# Patient Record
Sex: FEMALE | Race: WHITE | Employment: OTHER | URBAN - METROPOLITAN AREA
[De-identification: names, ages, dates, MRNs, and addresses within clinical notes are randomized per-mention and may not be internally consistent; named-entity substitution may affect disease eponyms.]

---

## 2017-03-21 PROCEDURE — 87086 URINE CULTURE/COLONY COUNT: CPT | Performed by: INTERNAL MEDICINE

## 2017-05-03 PROCEDURE — 87086 URINE CULTURE/COLONY COUNT: CPT | Performed by: OBSTETRICS & GYNECOLOGY

## 2017-05-03 PROCEDURE — 88175 CYTOPATH C/V AUTO FLUID REDO: CPT | Performed by: OBSTETRICS & GYNECOLOGY

## 2018-03-23 NOTE — ED PROVIDER NOTES
Patient Seen in: [facility] Emergency Department    History   Patient presents with:  Dyspnea ONOFRE SOB (respiratory)  Allergic Rxn Allergies (immune)    Stated Complaint: pt states \"I'm having an allergic reaction\", rachel duagn    HPI    67-year-old air)    Current:/94   Pulse 110   Temp 99.5 °F (37.5 °C)   Resp 20   Ht 172.7 cm (5' 8\")   Wt 81.6 kg   SpO2 100%   BMI 27.37 kg/m²         Physical Exam    HEENT : NCAT, EOMI, PEERL, throat clear, neck supple, no JVD, trachea midline, No LAD  Heart

## 2020-01-08 ENCOUNTER — HOSPITAL ENCOUNTER (EMERGENCY)
Facility: HOSPITAL | Age: 70
Discharge: HOME | End: 2020-01-08
Attending: EMERGENCY MEDICINE
Payer: MEDICARE

## 2020-01-08 VITALS
SYSTOLIC BLOOD PRESSURE: 120 MMHG | TEMPERATURE: 98 F | DIASTOLIC BLOOD PRESSURE: 65 MMHG | RESPIRATION RATE: 18 BRPM | OXYGEN SATURATION: 97 % | WEIGHT: 180 LBS | BODY MASS INDEX: 28.25 KG/M2 | HEIGHT: 67 IN | HEART RATE: 85 BPM

## 2020-01-08 DIAGNOSIS — R11.2 NAUSEA AND VOMITING IN ADULT: Primary | ICD-10-CM

## 2020-01-08 LAB
ALBUMIN SERPL-MCNC: 4.1 G/DL (ref 3.4–5)
ALBUMIN/GLOB SERPL: 1 {RATIO} (ref 1–2)
ALP LIVER SERPL-CCNC: 97 U/L (ref 55–142)
ALT SERPL-CCNC: 34 U/L (ref 13–56)
ANION GAP SERPL CALC-SCNC: 9 MMOL/L (ref 0–18)
AST SERPL-CCNC: 24 U/L (ref 15–37)
BASOPHILS # BLD AUTO: 0.06 X10(3) UL (ref 0–0.2)
BASOPHILS NFR BLD AUTO: 0.7 %
BILIRUB SERPL-MCNC: 0.6 MG/DL (ref 0.1–2)
BUN BLD-MCNC: 11 MG/DL (ref 7–18)
BUN/CREAT SERPL: 11.5 (ref 10–20)
CALCIUM BLD-MCNC: 9.2 MG/DL (ref 8.5–10.1)
CHLORIDE SERPL-SCNC: 102 MMOL/L (ref 98–112)
CO2 SERPL-SCNC: 23 MMOL/L (ref 21–32)
CREAT BLD-MCNC: 0.96 MG/DL (ref 0.55–1.02)
DEPRECATED RDW RBC AUTO: 42.2 FL (ref 35.1–46.3)
EOSINOPHIL # BLD AUTO: 0.01 X10(3) UL (ref 0–0.7)
EOSINOPHIL NFR BLD AUTO: 0.1 %
ERYTHROCYTE [DISTWIDTH] IN BLOOD BY AUTOMATED COUNT: 12.8 % (ref 11–15)
GLOBULIN PLAS-MCNC: 4.1 G/DL (ref 2.8–4.4)
GLUCOSE BLD-MCNC: 147 MG/DL (ref 70–99)
HCT VFR BLD AUTO: 45.6 % (ref 35–48)
HGB BLD-MCNC: 15.4 G/DL (ref 12–16)
IMM GRANULOCYTES # BLD AUTO: 0.02 X10(3) UL (ref 0–1)
IMM GRANULOCYTES NFR BLD: 0.2 %
LIPASE SERPL-CCNC: 197 U/L (ref 73–393)
LYMPHOCYTES # BLD AUTO: 1.24 X10(3) UL (ref 1–4)
LYMPHOCYTES NFR BLD AUTO: 14.7 %
M PROTEIN MFR SERPL ELPH: 8.2 G/DL (ref 6.4–8.2)
MCH RBC QN AUTO: 29.8 PG (ref 26–34)
MCHC RBC AUTO-ENTMCNC: 33.8 G/DL (ref 31–37)
MCV RBC AUTO: 88.4 FL (ref 80–100)
MONOCYTES # BLD AUTO: 0.44 X10(3) UL (ref 0.1–1)
MONOCYTES NFR BLD AUTO: 5.2 %
NEUTROPHILS # BLD AUTO: 6.66 X10 (3) UL (ref 1.5–7.7)
NEUTROPHILS # BLD AUTO: 6.66 X10(3) UL (ref 1.5–7.7)
NEUTROPHILS NFR BLD AUTO: 79.1 %
OSMOLALITY SERPL CALC.SUM OF ELEC: 280 MOSM/KG (ref 275–295)
PLATELET # BLD AUTO: 288 10(3)UL (ref 150–450)
POTASSIUM SERPL-SCNC: 3.7 MMOL/L (ref 3.5–5.1)
RBC # BLD AUTO: 5.16 X10(6)UL (ref 3.8–5.3)
SODIUM SERPL-SCNC: 134 MMOL/L (ref 136–145)
WBC # BLD AUTO: 8.4 X10(3) UL (ref 4–11)

## 2020-01-08 PROCEDURE — 83690 ASSAY OF LIPASE: CPT | Performed by: EMERGENCY MEDICINE

## 2020-01-08 PROCEDURE — 80053 COMPREHEN METABOLIC PANEL: CPT | Performed by: EMERGENCY MEDICINE

## 2020-01-08 PROCEDURE — 96374 THER/PROPH/DIAG INJ IV PUSH: CPT

## 2020-01-08 PROCEDURE — 96375 TX/PRO/DX INJ NEW DRUG ADDON: CPT

## 2020-01-08 PROCEDURE — 80053 COMPREHEN METABOLIC PANEL: CPT

## 2020-01-08 PROCEDURE — 85025 COMPLETE CBC W/AUTO DIFF WBC: CPT

## 2020-01-08 PROCEDURE — 83690 ASSAY OF LIPASE: CPT

## 2020-01-08 PROCEDURE — 85025 COMPLETE CBC W/AUTO DIFF WBC: CPT | Performed by: EMERGENCY MEDICINE

## 2020-01-08 PROCEDURE — 99284 EMERGENCY DEPT VISIT MOD MDM: CPT

## 2020-01-08 PROCEDURE — 96361 HYDRATE IV INFUSION ADD-ON: CPT

## 2020-01-08 RX ORDER — DIPHENHYDRAMINE HYDROCHLORIDE 50 MG/ML
25 INJECTION INTRAMUSCULAR; INTRAVENOUS ONCE
Status: COMPLETED | OUTPATIENT
Start: 2020-01-08 | End: 2020-01-08

## 2020-01-08 RX ORDER — ONDANSETRON 2 MG/ML
4 INJECTION INTRAMUSCULAR; INTRAVENOUS ONCE
Status: COMPLETED | OUTPATIENT
Start: 2020-01-08 | End: 2020-01-08

## 2020-01-08 RX ORDER — METOCLOPRAMIDE HYDROCHLORIDE 5 MG/ML
10 INJECTION INTRAMUSCULAR; INTRAVENOUS ONCE
Status: COMPLETED | OUTPATIENT
Start: 2020-01-08 | End: 2020-01-08

## 2020-01-08 RX ORDER — ONDANSETRON 4 MG/1
4 TABLET, ORALLY DISINTEGRATING ORAL EVERY 4 HOURS PRN
Qty: 10 TABLET | Refills: 0 | Status: SHIPPED | OUTPATIENT
Start: 2020-01-08 | End: 2020-01-15

## 2020-01-08 NOTE — ED INITIAL ASSESSMENT (HPI)
Patient recently had the flu over [address] Last night patient developed dizziness and headache and vomiting. Cramping to toes, flushed face. Patient had these same symptoms when she had the flu. No known fevers. Patient anxious.

## 2020-01-09 NOTE — ED PROVIDER NOTES
Patient Seen in: [facility] Emergency Department      History   Patient presents with:  Nausea/Vomiting/Diarrhea    Stated Complaint: vomiting, shaking, headache    HPI    Onelia is a 68-year-old female who presents with her  today for evalua Temp 98.1 °F (36.7 °C)   Temp src Temporal   SpO2 98 %   O2 Device None (Room air)       Current:/65   Pulse 85   Temp 98.1 °F (36.7 °C) (Temporal)   Resp 18   Ht 170.2 cm (5' 7\")   Wt 81.6 kg   SpO2 97%   BMI 28.19 kg/m²         Physical Exam  Nu URINALYSIS WITH CULTURE REFLEX   RAINBOW DRAW BLUE   RAINBOW DRAW LAVENDER   RAINBOW DRAW LIGHT GREEN   RAINBOW DRAW GOLD   CBC W/ DIFFERENTIAL     This case is discussed with Dr. Prema Ross who evaluates patient and agrees with the plan of care.      Patient

## 2021-03-15 DIAGNOSIS — Z23 NEED FOR VACCINATION: ICD-10-CM

## 2021-07-06 ENCOUNTER — HOSPITAL ENCOUNTER (OUTPATIENT)
Age: 71
Discharge: HOME | End: 2021-07-06
Attending: EMERGENCY MEDICINE
Payer: MEDICARE

## 2021-07-06 ENCOUNTER — APPOINTMENT (OUTPATIENT)
Dept: GENERAL RADIOLOGY | Age: 71
End: 2021-07-06
Attending: EMERGENCY MEDICINE
Payer: MEDICARE

## 2021-07-06 VITALS
HEART RATE: 87 BPM | RESPIRATION RATE: 16 BRPM | SYSTOLIC BLOOD PRESSURE: 132 MMHG | DIASTOLIC BLOOD PRESSURE: 93 MMHG | OXYGEN SATURATION: 100 % | TEMPERATURE: 98 F

## 2021-07-06 DIAGNOSIS — S92.912A CLOSED NONDISPLACED FRACTURE OF PHALANX OF TOE OF LEFT FOOT, UNSPECIFIED TOE, INITIAL ENCOUNTER: Primary | ICD-10-CM

## 2021-07-06 PROCEDURE — 73660 X-RAY EXAM OF TOE(S): CPT | Performed by: EMERGENCY MEDICINE

## 2021-07-06 PROCEDURE — 28510 TREATMENT OF TOE FRACTURE: CPT

## 2021-07-06 PROCEDURE — 99213 OFFICE O/P EST LOW 20 MIN: CPT

## 2021-07-06 RX ORDER — HYDROCODONE BITARTRATE AND ACETAMINOPHEN 5; 325 MG/1; MG/1
1-2 TABLET ORAL EVERY 6 HOURS PRN
Qty: 10 TABLET | Refills: 0 | Status: SHIPPED | OUTPATIENT
Start: 2021-07-06 | End: 2021-07-13

## 2021-07-07 NOTE — ED PROVIDER NOTES
Patient Seen in: [facility]      History   Patient presents with:   Toe Injury    Stated Complaint: toe pain/injury    HPI/Subjective:   HPI    Patient is 77-year-old male who states this morning she accidentally kicked a piece of furnitur foot nontender. SKIN: No rash, good turgor. NEURO: Patient answers questions appropriately. No focal deficits appreciated.          ED Course   Labs Reviewed - No data to display       Left second toe fracture distal phalanx         MDM      Patient give

## 2021-07-07 NOTE — ED INITIAL ASSESSMENT (HPI)
Left 2nd toe - pt stubbed to on a gym equipement at about 11 am today. Per pt she is unable to bend the toe. Dark purple  Discoloration noted, nail intact. John Looney

## 2022-04-25 ENCOUNTER — TELEPHONE (OUTPATIENT)
Dept: PODIATRY CLINIC | Facility: CLINIC | Age: 72
End: 2022-04-25

## 2022-04-25 NOTE — TELEPHONE ENCOUNTER
Former pt of Dr Cindy Garduno called about orthotics that were guaranteed for five years and only 1years old and disintegrating looking for solution.

## 2022-05-12 ENCOUNTER — HOSPITAL ENCOUNTER (OUTPATIENT)
Age: 72
Discharge: HOME | End: 2022-05-12
Payer: MEDICARE

## 2022-05-12 ENCOUNTER — APPOINTMENT (OUTPATIENT)
Dept: GENERAL RADIOLOGY | Age: 72
End: 2022-05-12
Attending: PHYSICIAN ASSISTANT
Payer: MEDICARE

## 2022-05-12 VITALS
DIASTOLIC BLOOD PRESSURE: 76 MMHG | RESPIRATION RATE: 16 BRPM | HEART RATE: 92 BPM | SYSTOLIC BLOOD PRESSURE: 124 MMHG | OXYGEN SATURATION: 98 % | TEMPERATURE: 98 F

## 2022-05-12 DIAGNOSIS — T14.8XXA FOREIGN BODY IN SKIN: Primary | ICD-10-CM

## 2022-05-12 PROCEDURE — 99213 OFFICE O/P EST LOW 20 MIN: CPT

## 2022-05-12 PROCEDURE — 73660 X-RAY EXAM OF TOE(S): CPT | Performed by: PHYSICIAN ASSISTANT

## 2022-05-12 NOTE — ED INITIAL ASSESSMENT (HPI)
right foot - per pt she noted a trail of blood  On the floor , bleeding from the right big toe. Pt is unsure if she stepped on a glass or from splinter form a the wooden floor. States big toe still bleeding intermittently. She states a glass broke 2 weeks ago per pt.  Last tdap 2021

## 2022-06-06 ENCOUNTER — TELEPHONE (OUTPATIENT)
Dept: PODIATRY CLINIC | Facility: CLINIC | Age: 72
End: 2022-06-06

## 2022-06-06 NOTE — TELEPHONE ENCOUNTER
Patient came to office to  refurbished orthotics. She has never had the bump in middle of arch and will try for a day and may call back.

## 2022-07-01 ENCOUNTER — HOSPITAL ENCOUNTER (OUTPATIENT)
Dept: GENERAL RADIOLOGY | Age: 72
Discharge: HOME | End: 2022-07-01
Attending: PHYSICIAN ASSISTANT
Payer: MEDICARE

## 2022-07-01 DIAGNOSIS — U07.1 INFECTION DUE TO 2019-NCOV: ICD-10-CM

## 2022-07-01 DIAGNOSIS — R06.00 DYSPNEA: ICD-10-CM

## 2022-07-01 PROCEDURE — 71046 X-RAY EXAM CHEST 2 VIEWS: CPT | Performed by: PHYSICIAN ASSISTANT

## 2023-08-27 ENCOUNTER — HOSPITAL ENCOUNTER (EMERGENCY)
Facility: HOSPITAL | Age: 73
Discharge: HOME | End: 2023-08-27
Attending: STUDENT IN AN ORGANIZED HEALTH CARE EDUCATION/TRAINING PROGRAM
Payer: MEDICARE

## 2023-08-27 ENCOUNTER — APPOINTMENT (OUTPATIENT)
Dept: CT IMAGING | Facility: HOSPITAL | Age: 73
End: 2023-08-27
Attending: STUDENT IN AN ORGANIZED HEALTH CARE EDUCATION/TRAINING PROGRAM
Payer: MEDICARE

## 2023-08-27 VITALS
WEIGHT: 190 LBS | HEART RATE: 73 BPM | RESPIRATION RATE: 16 BRPM | BODY MASS INDEX: 29 KG/M2 | DIASTOLIC BLOOD PRESSURE: 97 MMHG | SYSTOLIC BLOOD PRESSURE: 121 MMHG | TEMPERATURE: 98 F | OXYGEN SATURATION: 97 %

## 2023-08-27 DIAGNOSIS — H81.10 BENIGN PAROXYSMAL POSITIONAL VERTIGO, UNSPECIFIED LATERALITY: Primary | ICD-10-CM

## 2023-08-27 LAB
ALBUMIN SERPL-MCNC: 3.4 G/DL (ref 3.4–5)
ALBUMIN/GLOB SERPL: 0.9 {RATIO} (ref 1–2)
ALP LIVER SERPL-CCNC: 64 U/L
ALT SERPL-CCNC: 25 U/L
ANION GAP SERPL CALC-SCNC: 3 MMOL/L (ref 0–18)
AST SERPL-CCNC: 12 U/L (ref 15–37)
BASOPHILS # BLD AUTO: 0.05 X10(3) UL (ref 0–0.2)
BASOPHILS NFR BLD AUTO: 0.8 %
BILIRUB SERPL-MCNC: 0.4 MG/DL (ref 0.1–2)
BUN BLD-MCNC: 12 MG/DL (ref 7–18)
CALCIUM BLD-MCNC: 8.7 MG/DL (ref 8.5–10.1)
CHLORIDE SERPL-SCNC: 107 MMOL/L (ref 98–112)
CO2 SERPL-SCNC: 26 MMOL/L (ref 21–32)
CREAT BLD-MCNC: 0.7 MG/DL
EGFRCR SERPLBLD CKD-EPI 2021: 91 ML/MIN/1.73M2 (ref 60–?)
EOSINOPHIL # BLD AUTO: 0.07 X10(3) UL (ref 0–0.7)
EOSINOPHIL NFR BLD AUTO: 1.1 %
ERYTHROCYTE [DISTWIDTH] IN BLOOD BY AUTOMATED COUNT: 12.5 %
GLOBULIN PLAS-MCNC: 3.6 G/DL (ref 2.8–4.4)
GLUCOSE BLD-MCNC: 130 MG/DL (ref 70–99)
HCT VFR BLD AUTO: 42.9 %
HGB BLD-MCNC: 14.2 G/DL
IMM GRANULOCYTES # BLD AUTO: 0.02 X10(3) UL (ref 0–1)
IMM GRANULOCYTES NFR BLD: 0.3 %
LYMPHOCYTES # BLD AUTO: 1.18 X10(3) UL (ref 1–4)
LYMPHOCYTES NFR BLD AUTO: 18 %
MCH RBC QN AUTO: 29.8 PG (ref 26–34)
MCHC RBC AUTO-ENTMCNC: 33.1 G/DL (ref 31–37)
MCV RBC AUTO: 89.9 FL
MONOCYTES # BLD AUTO: 0.57 X10(3) UL (ref 0.1–1)
MONOCYTES NFR BLD AUTO: 8.7 %
NEUTROPHILS # BLD AUTO: 4.67 X10 (3) UL (ref 1.5–7.7)
NEUTROPHILS # BLD AUTO: 4.67 X10(3) UL (ref 1.5–7.7)
NEUTROPHILS NFR BLD AUTO: 71.1 %
OSMOLALITY SERPL CALC.SUM OF ELEC: 284 MOSM/KG (ref 275–295)
PLATELET # BLD AUTO: 255 10(3)UL (ref 150–450)
POTASSIUM SERPL-SCNC: 3.8 MMOL/L (ref 3.5–5.1)
PROT SERPL-MCNC: 7 G/DL (ref 6.4–8.2)
RBC # BLD AUTO: 4.77 X10(6)UL
SARS-COV-2 RNA RESP QL NAA+PROBE: NOT DETECTED
SODIUM SERPL-SCNC: 136 MMOL/L (ref 136–145)
WBC # BLD AUTO: 6.6 X10(3) UL (ref 4–11)

## 2023-08-27 PROCEDURE — 93010 ELECTROCARDIOGRAM REPORT: CPT

## 2023-08-27 PROCEDURE — 96375 TX/PRO/DX INJ NEW DRUG ADDON: CPT

## 2023-08-27 PROCEDURE — 70450 CT HEAD/BRAIN W/O DYE: CPT | Performed by: STUDENT IN AN ORGANIZED HEALTH CARE EDUCATION/TRAINING PROGRAM

## 2023-08-27 PROCEDURE — 99284 EMERGENCY DEPT VISIT MOD MDM: CPT

## 2023-08-27 PROCEDURE — 96374 THER/PROPH/DIAG INJ IV PUSH: CPT

## 2023-08-27 PROCEDURE — 99285 EMERGENCY DEPT VISIT HI MDM: CPT

## 2023-08-27 PROCEDURE — 85025 COMPLETE CBC W/AUTO DIFF WBC: CPT | Performed by: STUDENT IN AN ORGANIZED HEALTH CARE EDUCATION/TRAINING PROGRAM

## 2023-08-27 PROCEDURE — 80053 COMPREHEN METABOLIC PANEL: CPT | Performed by: STUDENT IN AN ORGANIZED HEALTH CARE EDUCATION/TRAINING PROGRAM

## 2023-08-27 PROCEDURE — 93005 ELECTROCARDIOGRAM TRACING: CPT

## 2023-08-27 PROCEDURE — 85025 COMPLETE CBC W/AUTO DIFF WBC: CPT

## 2023-08-27 PROCEDURE — 80053 COMPREHEN METABOLIC PANEL: CPT

## 2023-08-27 RX ORDER — MECLIZINE HYDROCHLORIDE 25 MG/1
25 TABLET ORAL ONCE
Status: COMPLETED | OUTPATIENT
Start: 2023-08-27 | End: 2023-08-27

## 2023-08-27 RX ORDER — MECLIZINE HYDROCHLORIDE 25 MG/1
25 TABLET ORAL 3 TIMES DAILY PRN
Qty: 30 TABLET | Refills: 0 | Status: SHIPPED | OUTPATIENT
Start: 2023-08-27

## 2023-08-27 RX ORDER — DIPHENHYDRAMINE HYDROCHLORIDE 50 MG/ML
25 INJECTION INTRAMUSCULAR; INTRAVENOUS ONCE
Status: COMPLETED | OUTPATIENT
Start: 2023-08-27 | End: 2023-08-27

## 2023-08-27 RX ORDER — METOCLOPRAMIDE HYDROCHLORIDE 5 MG/ML
10 INJECTION INTRAMUSCULAR; INTRAVENOUS ONCE
Status: COMPLETED | OUTPATIENT
Start: 2023-08-27 | End: 2023-08-27

## 2023-08-27 RX ORDER — DIAZEPAM 5 MG/1
5 TABLET ORAL ONCE
Status: COMPLETED | OUTPATIENT
Start: 2023-08-27 | End: 2023-08-27

## 2023-08-28 ENCOUNTER — OFFICE VISIT (OUTPATIENT)
Dept: NEUROLOGY | Facility: CLINIC | Age: 73
End: 2023-08-28
Payer: MEDICARE

## 2023-08-28 VITALS — DIASTOLIC BLOOD PRESSURE: 78 MMHG | HEART RATE: 72 BPM | SYSTOLIC BLOOD PRESSURE: 128 MMHG | RESPIRATION RATE: 18 BRPM

## 2023-08-28 DIAGNOSIS — R26.81 GAIT INSTABILITY: ICD-10-CM

## 2023-08-28 DIAGNOSIS — R20.0 NUMBNESS IN FEET: Primary | ICD-10-CM

## 2023-08-28 DIAGNOSIS — H81.10 BENIGN PAROXYSMAL POSITIONAL VERTIGO, UNSPECIFIED LATERALITY: ICD-10-CM

## 2023-08-28 LAB
ATRIAL RATE: 65 BPM
P AXIS: 58 DEGREES
P-R INTERVAL: 178 MS
Q-T INTERVAL: 446 MS
QRS DURATION: 90 MS
QTC CALCULATION (BEZET): 463 MS
R AXIS: 52 DEGREES
T AXIS: 31 DEGREES
VENTRICULAR RATE: 65 BPM

## 2023-08-28 PROCEDURE — 99204 OFFICE O/P NEW MOD 45 MIN: CPT | Performed by: OTHER

## 2023-08-28 NOTE — ED INITIAL ASSESSMENT (HPI)
72YF c/c of dizziness Pt state that she started having dizziness and nausea this morning with feeling of the room spinning Pt state that she took an old Antivert and had improvement

## 2023-09-13 ENCOUNTER — TELEPHONE (OUTPATIENT)
Dept: PHYSICAL THERAPY | Facility: HOSPITAL | Age: 73
End: 2023-09-13

## 2023-09-27 ENCOUNTER — PROCEDURE VISIT (OUTPATIENT)
Dept: NEUROLOGY | Facility: CLINIC | Age: 73
End: 2023-09-27
Payer: MEDICARE

## 2023-09-27 DIAGNOSIS — R20.0 NUMBNESS IN FEET: Primary | ICD-10-CM

## 2023-09-27 DIAGNOSIS — R26.81 GAIT INSTABILITY: ICD-10-CM

## 2023-09-27 PROCEDURE — 95910 NRV CNDJ TEST 7-8 STUDIES: CPT | Performed by: OTHER

## 2023-09-27 PROCEDURE — 95886 MUSC TEST DONE W/N TEST COMP: CPT | Performed by: OTHER

## 2023-10-02 ENCOUNTER — TELEPHONE (OUTPATIENT)
Dept: PHYSICAL THERAPY | Facility: HOSPITAL | Age: 73
End: 2023-10-02

## 2023-10-05 ENCOUNTER — OFFICE VISIT (OUTPATIENT)
Dept: PHYSICAL THERAPY | Facility: HOSPITAL | Age: 73
End: 2023-10-05
Attending: Other
Payer: MEDICARE

## 2023-10-05 DIAGNOSIS — H81.10 BENIGN PAROXYSMAL POSITIONAL VERTIGO, UNSPECIFIED LATERALITY: Primary | ICD-10-CM

## 2023-10-05 PROCEDURE — 97110 THERAPEUTIC EXERCISES: CPT

## 2023-10-05 PROCEDURE — 97161 PT EVAL LOW COMPLEX 20 MIN: CPT

## 2023-10-05 NOTE — PROGRESS NOTES
VESTIBULAR EVALUATION:     Diagnosis:   Benign paroxysmal positional vertigo, unspecified laterality       Referring Provider: Tao  Date of Evaluation:    10/5/2023    Precautions:  None Next MD visit:   none scheduled  Date of Surgery: n/a     PATIENT SUMMARY   Jerzy Terry is a 74year old female who presents to therapy today with reports of intermittent hx of vertiginous episodes over her lifetime. States that she has had vertigo about 5x to her recall, typically with years in between episodes. Most recent episode was on 8/27/23. She states that for a few weeks leading up to this, she was experiencing bouts of lightheadedness, but not spinning. On 8/27, she experienced severe vertigo with spinning, so that she had to stay in bed with eyes closed and not move all day. She did go to the ED and reports that she had blood work, EKG, and imaging of the brain, all of which were unremarkable. States they were not able to do much for her at the ED, told her to go to PT. She was seeing a neurologist for a separate issue, so discussed with him, he also referred her to PT. At this time, notes that she has not had any dizziness for over 3 weeks. She only had the severe spinning during the initial onset, following that, she was feeling not right, has been avoiding bending over, some lightheadedness, but generally has been back to her baseline. Wanted to come today to see if there was anything else that could be done to help her avoid these episodes in the future or to understand what to do should they come on. Has hx of motion sickness and neuropathy at baseline.      Reason for onset: unknown, patient denies hx of known neurological issue, hitting her head, concussion, illness, travel, change in medication, or known issue with heart/BP; she speculates may also be related to times of stress and anxiety for her  Meclizine: intermittent use, sometimes before activities like getting on a boat  N/V: not currently, can become nauseous with episodes of dizziness     Symptoms with cough/sneeze or loud noise: No   Falls: No  Hx of migraines: No   Hx of vision issue: No   Hx of hearing issues: tinnitus [place] ears, intermittent    Dizziness: Current 0/10, Best 0/10, Worst 10/10  Quality: room spinning, lightheadedness  Frequency/Duration:  severe for minutes to hours followed by lightheadedness that can last days   Aggravates: Unsure, has still been avoiding bending over with head down  Relieves: Not moving and Unsure    Headache: Denies  Cervical pain: intermittent stiffness, generally denies, not restricting her    Current functional limitations include currently only avoiding bending over with head down because fearful will make her dizzy, she is otherwise not restricting activity in any other way; when she is having a dizzy episode, she cannot do anything but lay in bed in the dark with EC. Social history:  Patient lives at home with her , she is drives, is a community ambulator, and is retired. Rashad Saleh describes prior level of function as IND in household and community level activity. Pt goals include to better understand what is causing this so that she knows what to do when it comes on. Past medical history was reviewed with Rashad Saleh. Significant findings include:  Past Medical History:   Diagnosis Date    Allergic rhinitis     Anxiety state     Esophageal reflux     Fibromyalgia     Pneumonia     Seasonal allergies     Sensory neuropathy     cough    Sleep apnea           ASSESSMENT  Rashad Saleh is a 74year old female who presents for skilled physical therapy evaluation on 10/5/23 with primary c/o episodic spinning vertigo last occurred on 8/27/23. The results of the objective tests and measures show vestibulo-ocular, oculomotor, positional assessment, and postural control all unremarkable.   Functional deficits include but are not limited to none at this time, she is completely debilitated when dizziness comes on, has not occurred for 3+ weeks with performing all normal activity. Signs and symptoms are consistent with diagnosis of vertigo, uncertain of more specific diagnosis at this time since is not actively having dizziness symptoms, requested patient call to return to clinic in future to be reassessed when vertigo is active. Pt and PT discussed evaluation findings, pathology, POC and HEP. Pt voiced understanding and performs HEP correctly. Skilled Physical Therapy is medically necessary to address the above impairments and reach functional goals. OBJECTIVE:   Physical Exam:  Posture/Observation: Patient sits and stands with appropriate posturing. She is pleasant, oriented, compliant, and appropriate, expresses motivation to improve. Neuro Screen: Sensation: NFT, patient reports neuropathy at baseline      Coordination Testing:   Finger to Nose: WNL   Pronation/supination: WNL   Toe tapping:  WNL     Cervical spine ROM: WFL throughout without dizziness  Adverse neuro signs with ROM: yes no: no     Occulomotor & Vestibulo-Ocular Exam:  Spontaneous Nystagmus: room light: negative ;  fixation blocked: negative  Smooth Pursuit: Negative  Saccades: Negative  Gaze Evoked Nystagmus:  room light: Negative; fixation blocked: Negative  Head Thrust: Negative  VOR screen:  Negative/WNL slow and rapid, vertical and horizontal    VOR Cancellation: Negative   Convergence: Negative  Cover/Uncover:  Negative  Cross Cover:  Negative  Head Shaking Nystagmus: Negative  Dynamic Visual Acuity:  Not Tested    Positional Testing:   Lisle-Hallpike: R Negative, L Negative   Roll Test [facility]): Negative      Postural Control:   Romberg: EO >30 sec, EC >30 sec   Romberg on Foam: EO >30 sec, EC >30 sec with mod sway  Tandem Stance: R back EO >10 sec; L back EO >10 sec  SLS: R EO >5 sec; L EO >5 sec    Functional Mobility:   Gait: pt ambulates on level ground without AD, generally unremarkable  4 Item DGI: 12/12  Timed Up and Go: TBA in future as needed Five Times Sit to Stand Test: TBA in future as needed   Fwd bending with head down to retrieve item from floor: no issue, no dizziness     Today's Treatment and Response: Educated patient that at this time, all testing performed in session is considered normal and as is not having dizziness episode at this time, cannot give diagnosis, requested that she call to return immediately in future should same symptoms return, if symptoms differ, need to return to ED or MD first.   Pt education was provided on exam findings, treatment diagnosis, treatment plan, expectations, and prognosis. Pt was also provided recommendations for possible dizziness after evaluation. Patient was instructed in and issued a HEP for: to continue to monitor symptoms and call to return immediately in future should symptoms return    Charges: PT Eval Low Complexity, Therex x 1      Total Timed Treatment: 10 min     Total Treatment Time: 50 min     Based on clinical rationale and outcome measures, this evaluation involved Low Complexity decision making. PLAN OF CARE:    Goals: None at this time; educated patient that at this time, all testing performed in session is considered normal and as is not having dizziness episode at this time, cannot give diagnosis, requested that she call to return immediately in future should same symptoms return, if symptoms differ, need to return to ED or MD first.       Frequency / Duration: Patient will not be seen at this time due to all testing currently normal, however, should dizziness return within dates of POC, may return for further assessment. If do not hear from patient in that time, will consider this date discharge. Education or treatment limitation: None   Rehab Potential: good     Patient/Family/Caregiver was advised of these findings, precautions, and treatment options and has agreed to actively participate in planning and for this course of care.      Thank you for your referral. Please co-sign or sign and return this letter via fax as soon as possible to [phone]. If you have any questions, please contact me at Dept: [phone]    Sincerely,  Electronically signed by therapist: Nelson Meraz PT, DPT  [de-identified] certification required: Yes  I certify the need for these services furnished under this plan of treatment and while under my care.     X___________________________________________________ Date____________________    Certification From: 61/2/2930  To:1/3/2024

## 2023-10-09 ENCOUNTER — APPOINTMENT (OUTPATIENT)
Dept: PHYSICAL THERAPY | Facility: HOSPITAL | Age: 73
End: 2023-10-09
Attending: Other
Payer: MEDICARE

## 2023-10-17 ENCOUNTER — APPOINTMENT (OUTPATIENT)
Dept: PHYSICAL THERAPY | Facility: HOSPITAL | Age: 73
End: 2023-10-17
Attending: Other
Payer: MEDICARE

## 2023-10-23 ENCOUNTER — APPOINTMENT (OUTPATIENT)
Dept: PHYSICAL THERAPY | Facility: HOSPITAL | Age: 73
End: 2023-10-23
Attending: Other
Payer: MEDICARE

## 2023-10-25 ENCOUNTER — OFFICE VISIT (OUTPATIENT)
Dept: SURGERY | Facility: CLINIC | Age: 73
End: 2023-10-25

## 2023-10-25 ENCOUNTER — TELEPHONE (OUTPATIENT)
Dept: SURGERY | Facility: CLINIC | Age: 73
End: 2023-10-25

## 2023-10-25 DIAGNOSIS — R82.90 URINE FINDING: ICD-10-CM

## 2023-10-25 DIAGNOSIS — N39.41 URGE INCONTINENCE: Primary | ICD-10-CM

## 2023-10-25 DIAGNOSIS — R31.29 MICROHEMATURIA: ICD-10-CM

## 2023-10-25 LAB
APPEARANCE: CLEAR
BILIRUBIN: NEGATIVE
GLUCOSE (URINE DIPSTICK): NEGATIVE MG/DL
KETONES (URINE DIPSTICK): NEGATIVE MG/DL
LEUKOCYTES: NEGATIVE
MULTISTIX LOT#: ABNORMAL NUMERIC
NITRITE, URINE: NEGATIVE
PH, URINE: 5.5 (ref 4.5–8)
PROTEIN (URINE DIPSTICK): NEGATIVE MG/DL
SPECIFIC GRAVITY: 1.02 (ref 1–1.03)
URINE-COLOR: YELLOW
UROBILINOGEN,SEMI-QN: 0.2 MG/DL (ref 0–1.9)

## 2023-10-25 PROCEDURE — 81003 URINALYSIS AUTO W/O SCOPE: CPT | Performed by: PHYSICIAN ASSISTANT

## 2023-10-25 PROCEDURE — 99243 OFF/OP CNSLTJ NEW/EST LOW 30: CPT | Performed by: PHYSICIAN ASSISTANT

## 2023-10-25 RX ORDER — CETIRIZINE HYDROCHLORIDE 10 MG/1
TABLET, CHEWABLE ORAL
COMMUNITY

## 2023-10-25 RX ORDER — VIBEGRON 75 MG/1
75 TABLET, FILM COATED ORAL DAILY
Qty: 90 TABLET | Refills: 3 | Status: SHIPPED | OUTPATIENT
Start: 2023-10-25 | End: 2024-10-19

## 2023-10-25 RX ORDER — CHLORAL HYDRATE 500 MG
CAPSULE ORAL
COMMUNITY

## 2023-10-25 NOTE — PATIENT INSTRUCTIONS
Dietary Irritants    What you can do to help relieve your symptoms:    The purpose of this handout is to provide information that can help you discover what you are ingesting or doing that may be contributing to your recurrent irritative voiding symptoms and what steps you can take to relieve your discomfort. Frequency, urgency, and pain on urination are symptoms of inflammation or irritation. These symptoms can be caused by bacterial infections or substances in the urine causing irritation to the lining of the bladder or urethra. Bacterial infections can be treated with antibiotics. But irritation of the bladder or urethra can results from other cause that will probably not respond to antibiotics. What to do at the first sign of bladder or urethral discomfort: The basic treatment for irritable bladder symptoms, whether induced by bacterial or nonbacterial irritants, is hydration. At the first sign of discomfort, start drinking 16 oz of water mixed with 1 teaspoon of baking soda. Then drink 8 oz of plain water every 20 minutes for the next 2-3 hours. Repeat drinking 16 oz of water with baking soda in 3-4 hours. (Baking soda contains sodium and can cause fluid retention. If you have a history of high blood pressure, congestive heart failure, or renal disease, you should not use more than twice in 24 hours.)    You can take acetaminophen to relieve the pain (two extra strength or three regular strength tablets). Bladder analgesics such as phenazopyridine (Pyridium) may help and will not alter the results of a urine culture should the symptoms not be significantly relieved by diluting the urine and flushing out the bladder. A warm bath to help muscles of the pelvic floor relax will also help lessen discomfort. Before starting any antibiotic, a urine culture must be taken. If the conservative measures mentioned above are not helping, call the office to arrange for a urine culture.  If the specimen is contaminated with vaginal cells and bacteria and you are severely symptomatic, then a catheterized specimen should be obtained directly from your bladder to determine precisely whether bacterial infection is the cause of your symptoms. Dietary irritants to the urinary tract to avoid:    Certain food can contribute to urinary frequency, urgency, and discomfort. If bladder symptoms are related to dietary factors, strict adherence to a diet that eliminated the food should bring significant relief in 10 days. Once you are feeling better, you can begin to add foods back into your diet one at a time. If the symptoms return, you will be able to identify the irritant. As you add foods back to your diet it is very important that you drink significant amounts of water. These foods are acidic and are considered irritants to the bladder. They should be avoided. Alcoholic beverages  Apples and apple juice  Cantaloupe  Carbonated beverages  Chili and spicy foods  Citrus fruit  Chocolate  Coffee (including decaf)  Cranberries and juice  Grapes  Guava  Peaches  Pineapple  Plums  Strawberries  Sugar*  Tea  Tomatoes  Vit B Complex  Vinegar  *Some women report that sugar flares their symptoms. Low acid fruit substitutions include apricots, papaya, pears, and watermelon. Coffee drinkers can drink Kava or other low-acid instant drinks. Tea drinks can substitute non-citrus herbal and sun brewed teas. Calcium carbonate co-buffered with calcium ascorbate can be substituted for Vitamin C. Use the link below to find additional information regarding Overactive Bladder:    [email or web address]

## 2023-10-25 NOTE — PROGRESS NOTES
[facility], [address] [facility] [place]    Urology Consult Note    History of Present Illness:   Patient is a 74year old female with hx of chronic cough, peripheral neuropathy, who presents today for consultation from Dr. Aditya Salguero office for urge incontinence. Duration:13 years    denies leakage with cough, sneeze, activity:  complains of leakage with strong urge to void:   denies leakage without sensory awareness:  denies bladder or pelvic pain:     Frequency q 3-4 hours  Nocturia x 0   Pads per day: 4+ thick pads    1 cup of coffee daily in the morning. No additional caffiene. denies hematuria. denies dysuria  denies uti's    Was seen by gynecologist previously and given medications. Had been doing PFT with minimal improvement, 2018.     OB/GYN history:   Vaginal births:  2   :  0   Hysterectomy:  none    HISTORY:  Past Medical History:   Diagnosis Date    Allergic rhinitis     Anxiety state     Esophageal reflux     Fibromyalgia     Pneumonia     Seasonal allergies     Sensory neuropathy     cough    Sleep apnea       Past Surgical History:   Procedure Laterality Date    CATARACT      COLONOSCOPY      Dr. Ana Kimble, polyps    COLONOSCOPY  2022    2 hepatic flexure polyps    DESIGN CUSTOM BREAST IMPLANT      Breast augmentation    EGD      Dr. Ana Kimble    EGD  2022    normal    EGD N/A 2022    Procedure: ESOPHAGOGASTRODUODENOSCOPY, COLONOSCOPY, with polypectomy;  Surgeon: Veronica Thomas MD;  Location: [address] [place] [address]    IMPLANT LEFT      IMPLANT RIGHT      LEEP  2000    UPPER GI ENDOSCOPY,EXAM        Family History   Problem Relation Age of Onset    Hypertension Father     High Cholesterol Father     Other (Atrial Fib) Father     Other (DVT) Father     Hypertension Mother     High Cholesterol Mother     Glaucoma Mother     Other (Other) Mother     Other (Kidney stones) Mother     Other (Ulcers) Mother     Other (Coronary artery disease) Paternal Grandfather     Cancer Paternal Grandmother         Lung CA    Other (Osteoporosis) Maternal Grandmother     Other (Gout) Maternal Grandmother     Arthritis Other         Siblings    Glaucoma Other         Siblings    Asthma Other         Siblings, Children    Hypertension Brother     Prostate Cancer Brother       Social History:   Social History     Socioeconomic History    Marital status:    Tobacco Use    Smoking status: Never    Smokeless tobacco: Never   Vaping Use    Vaping Use: Never used   Substance and Sexual Activity    Alcohol use: No     Alcohol/week: 0.0 standard drinks of alcohol    Drug use: No    Sexual activity: Yes     Partners: Male        Allergies    Shrimp                  OTHER (SEE COMMENTS)    Comment:BREAKS OUT AROUND MOUTH  Sulfa Antibiotics       RASH    Review of Systems:   A 10-point review of systems was completed and is negative other than as noted above. Physical Exam:   There were no vitals taken for this visit. GENERAL APPEARANCE: well developed, well nourished, in no acute distress  NEUROLOGIC: no localizing neurologic signs, alert and oriented x 3, converses appropriately  HEAD: atraumatic, normocephalic  EYES: sclera non-icteric  ORAL CAVITY: mucosa moist  NECK/THYROID: no obvious masses or goiter  LUNGS: non-labored breathing  ABDOMEN: soft, nontender, nondistended  No CVAT  EXTREMITIES: warm, well-perfused. No clubbing, cyanosis or edema.   SKIN: no obvious rashes    Results:     Laboratory Data:  Lab Results   Component Value Date    WBC 6.6 08/27/2023    HGB 14.2 08/27/2023    .0 08/27/2023     Lab Results   Component Value Date     08/27/2023    K 3.8 08/27/2023     08/27/2023    CO2 26.0 08/27/2023    BUN 12 08/27/2023     (H) 08/27/2023    GFRAA 69 01/08/2020    AST 12 (L) 08/27/2023    ALT 25 08/27/2023    TP 7.0 08/27/2023    ALB 3.4 08/27/2023    CA 8.7 08/27/2023       Urinalysis Results (last three years):  Recent Labs 10/25/23  1325   SPECGRAVITY 1.025   PHURINE 5.5   NITRITE Negative       Urine Culture Results (last three years):  Lab Results   Component Value Date    URINECUL No Growth at 18-24 hrs. 05/03/2017    URINECUL No Growth at 18-24 hrs. 03/21/2017    URINECUL No Growth at 18-24 hrs. 10/20/2016       Imaging  No results found. Impression:     Patient is a 74year old female with hx of chronic cough, peripheral neuropathy, who presents today for consultation from Dr. Ifrah Hayes office for urge incontinence. 1. Kegel exercises reviewed. 2. OAB teaching with fluid and voiding techniques reviewed- Double voiding, bending over after void to completely empty. Recommend voids every 2-3 hours. 3. Benefit of biofeedback reviewed. Previously completed PFT with minimal improvement. 4. Medication trial: Gemtesa 75mg daily. .    Recommendations:  Timed voids every 2-3 hours. Bladder ultrasound. If normal ultrasound and no improvement with timed voids, will start Gemtesa 75mg and follow up in 2 months. Thank you very much for this consult. Please call if there are any questions or concerns.      Guanako Elias PA-C  Urology  [phone]    Date: 10/25/2023

## 2023-10-26 NOTE — TELEPHONE ENCOUNTER
Per pt has questions regarding tests that need to be completed, unsure if ultrasound still needs to be done. Please call thank you.

## 2023-10-30 ENCOUNTER — APPOINTMENT (OUTPATIENT)
Dept: PHYSICAL THERAPY | Facility: HOSPITAL | Age: 73
End: 2023-10-30
Attending: Other
Payer: MEDICARE

## 2023-11-01 ENCOUNTER — TELEPHONE (OUTPATIENT)
Dept: SURGERY | Facility: CLINIC | Age: 73
End: 2023-11-01

## 2023-11-01 NOTE — TELEPHONE ENCOUNTER
Patient calling has questions regarding medication that needs to be taking before the cystoscopy and also has questions and also would like to know if she will need to f/u with SHANEL kwan after the procedure?    Please advise     Vibegron (Myrle Inez) 75 MG Oral Tab

## 2023-11-02 NOTE — TELEPHONE ENCOUNTER
RN called patient. No answer. Left message to call office. Note, this patient was last seen on 10/25/23:    Recommendations:  Timed voids every 2-3 hours. Bladder ultrasound. If normal ultrasound and no improvement with timed voids, will start Gemtesa 75mg and follow up in 2 months. Medication Detail    Medication Quantity Refills Start End   Vibegron (GEMTESA) 75 MG Oral Tab 90 tablet 3 10/25/2023 10/19/2024   Sig:   Take 75 mg by mouth daily.

## 2023-11-03 ENCOUNTER — HOSPITAL ENCOUNTER (OUTPATIENT)
Dept: ULTRASOUND IMAGING | Age: 73
Discharge: HOME | End: 2023-11-03
Attending: PHYSICIAN ASSISTANT
Payer: MEDICARE

## 2023-11-03 DIAGNOSIS — N39.41 URGE INCONTINENCE: ICD-10-CM

## 2023-11-03 PROCEDURE — 76857 US EXAM PELVIC LIMITED: CPT | Performed by: PHYSICIAN ASSISTANT

## 2023-11-06 ENCOUNTER — APPOINTMENT (OUTPATIENT)
Dept: PHYSICAL THERAPY | Facility: HOSPITAL | Age: 73
End: 2023-11-06
Attending: Other
Payer: MEDICARE

## 2023-11-06 NOTE — TELEPHONE ENCOUNTER
Per pt states prior Katrinka Nim is needed for drewtesa, states she did receive call below but none of her questions were addressed, pt requesting to speak to RN. Please call thank you.

## 2023-11-09 ENCOUNTER — PROCEDURE (OUTPATIENT)
Dept: SURGERY | Facility: CLINIC | Age: 73
End: 2023-11-09
Payer: COMMERCIAL

## 2023-11-09 DIAGNOSIS — R82.90 URINE FINDING: Primary | ICD-10-CM

## 2023-11-09 LAB
APPEARANCE: CLEAR
BILIRUBIN: NEGATIVE
GLUCOSE (URINE DIPSTICK): NEGATIVE MG/DL
KETONES (URINE DIPSTICK): NEGATIVE MG/DL
LEUKOCYTES: NEGATIVE
MULTISTIX LOT#: NORMAL NUMERIC
NITRITE, URINE: NEGATIVE
OCCULT BLOOD: NEGATIVE
PH, URINE: 6.5 (ref 4.5–8)
PROTEIN (URINE DIPSTICK): NEGATIVE MG/DL
SPECIFIC GRAVITY: 1.01 (ref 1–1.03)
URINE-COLOR: YELLOW
UROBILINOGEN,SEMI-QN: 0.2 MG/DL (ref 0–1.9)

## 2023-11-09 PROCEDURE — 81003 URINALYSIS AUTO W/O SCOPE: CPT | Performed by: UROLOGY

## 2023-11-09 PROCEDURE — 52000 CYSTOURETHROSCOPY: CPT | Performed by: UROLOGY

## 2023-11-09 RX ORDER — CIPROFLOXACIN 500 MG/1
500 TABLET, FILM COATED ORAL ONCE
Status: SHIPPED | OUTPATIENT
Start: 2023-11-09

## 2023-11-09 NOTE — PROGRESS NOTES
Clinic Procedure Note    INDICATIONS:      Patient is a 74year old female with hx of chronic cough, peripheral neuropathy, who presents with urge incontinence and microhematuria. Patient saw Cris Souza initially with history of 13 years of urgency with urge incontinence. She denied any history of PATRIC. No leakage without sensation. No pelvic pain. She also has frequency. Uses 3-4 pads daily. Has 1 cup of coffee daily, no other modifiable factors. She was previously trialed on PFPT but this was in 2018 with minimal benefit. She has a history of 2 vaginal deliveries. She has no history of UTI. At last visit she was told to continue with PT exercises. She was instructed to double void and do timed voiding. Bim Hotter was ordered. Bladder ultrasound was done and showed PVR was 39cc and bladder otherwise completely normal.  A routine UA was done and showed microhematuria, she therefore returns today to discuss symptoms and for cystoscopy. CT urogram is scheduled for tomorrow. Gemtesa not yet started as patient needs prior authorization. PROCEDURE:       1. Flexible cystourethroscopy    DATE OF PROCEDURE: 2023     PRE-PROCEDURE DIAGNOSIS: Microhematuria, urinary urgency     POST-PROCEDURE DIAGNOSIS: Same     SURGEON: Sada Leong MD    FINDINGS:    Urethra: Normal caliber urethra throughout without lesions    Bladder: Normal mucosa with no papillary lesions or erythema, no trabeculation; mild trigone irritation     Ureteral orifices: Orthotopic    Other findings: None    PROCEDURE:   Patient was brought to the procedure suite and a time-out was performed identifiying the patient,  and procedure to be performed. The risks and benefits of the procedure were once again discussed with the patient including bleeding, infection, and dysuria. The patient agreed to proceed. The patient did not have any signs or symptoms of active UTI.     She was placed in supine position on the table with legs to the sides and the genital area was prepped and draped in the standard sterile fashion. A flexible cystoscope was inserted per urethra. There were no urethral strictures present. The bladder was fully inspected (including retroflexion) and showed findings as above. Both ureteral orifices were orthotopic. The scope was then carefully removed and once again no urethral abnormalities were noted. There were no complications and the patient tolerated the procedure well. IMPRESSION:  Patient is a 74year old female with hx of chronic cough, peripheral neuropathy, who presents with urge incontinence and microscopic hematuria. PLAN:   Cystoscopy with mild trigonitis but no other abnormal findings. She has not yet started [place], will start this week and monitor symptoms. Given slight atrophic vaginitis can also consider vaginal estrogen in the future if no improvement.    CT Urogram to be done tomorrow, Remington Ely PA-C to f/u     Return in 1-2 months with Margy Gresham to discuss symptoms after starting Gemtesa and to discuss results of CT urogram       Joe Antunez MD  Staff Urologist  [facility]  Office: [phone]

## 2023-11-10 ENCOUNTER — HOSPITAL ENCOUNTER (OUTPATIENT)
Dept: CT IMAGING | Facility: HOSPITAL | Age: 73
Discharge: HOME | End: 2023-11-10
Attending: PHYSICIAN ASSISTANT
Payer: MEDICARE

## 2023-11-10 DIAGNOSIS — R31.29 MICROHEMATURIA: ICD-10-CM

## 2023-11-10 LAB
CREAT BLD-MCNC: 1 MG/DL
EGFRCR SERPLBLD CKD-EPI 2021: 59 ML/MIN/1.73M2 (ref 60–?)

## 2023-11-10 PROCEDURE — 74178 CT ABD&PLV WO CNTR FLWD CNTR: CPT | Performed by: PHYSICIAN ASSISTANT

## 2023-11-10 PROCEDURE — 76377 3D RENDER W/INTRP POSTPROCES: CPT | Performed by: PHYSICIAN ASSISTANT

## 2023-11-10 PROCEDURE — 82565 ASSAY OF CREATININE: CPT

## 2023-11-13 ENCOUNTER — APPOINTMENT (OUTPATIENT)
Dept: PHYSICAL THERAPY | Facility: HOSPITAL | Age: 73
End: 2023-11-13
Attending: Other
Payer: MEDICARE

## 2023-11-15 ENCOUNTER — OFFICE VISIT (OUTPATIENT)
Dept: NEUROLOGY | Facility: CLINIC | Age: 73
End: 2023-11-15
Payer: MEDICARE

## 2023-11-15 VITALS
RESPIRATION RATE: 18 BRPM | OXYGEN SATURATION: 96 % | SYSTOLIC BLOOD PRESSURE: 120 MMHG | DIASTOLIC BLOOD PRESSURE: 70 MMHG | HEART RATE: 90 BPM

## 2023-11-15 DIAGNOSIS — H81.10 BENIGN PAROXYSMAL POSITIONAL VERTIGO, UNSPECIFIED LATERALITY: ICD-10-CM

## 2023-11-15 DIAGNOSIS — G62.9 NEUROPATHY: Primary | ICD-10-CM

## 2023-11-15 PROCEDURE — 99213 OFFICE O/P EST LOW 20 MIN: CPT | Performed by: OTHER

## 2023-11-20 ENCOUNTER — TELEPHONE (OUTPATIENT)
Dept: SURGERY | Facility: CLINIC | Age: 73
End: 2023-11-20

## 2023-11-20 NOTE — TELEPHONE ENCOUNTER
S/w patient. Patient notes that she had been taking Gemtesa with good results but then developed rosacea. Unclear if related. She wanted to verify she could discontinue without titration off. Reviewed okay to stop. She will consider repeat trial in 4-6 weeks. Will send coupon card in mail. If too expensive can try myrbetriq.

## 2023-12-04 ENCOUNTER — APPOINTMENT (OUTPATIENT)
Dept: PHYSICAL THERAPY | Facility: HOSPITAL | Age: 73
End: 2023-12-04
Attending: Other
Payer: MEDICARE

## 2023-12-11 ENCOUNTER — APPOINTMENT (OUTPATIENT)
Dept: PHYSICAL THERAPY | Facility: HOSPITAL | Age: 73
End: 2023-12-11
Attending: Other
Payer: MEDICARE

## 2023-12-18 ENCOUNTER — APPOINTMENT (OUTPATIENT)
Dept: PHYSICAL THERAPY | Facility: HOSPITAL | Age: 73
End: 2023-12-18
Attending: Other
Payer: MEDICARE

## 2024-01-17 ENCOUNTER — OFFICE VISIT (OUTPATIENT)
Dept: SURGERY | Facility: CLINIC | Age: 74
End: 2024-01-17
Payer: MEDICARE

## 2024-01-17 DIAGNOSIS — R31.29 MICROSCOPIC HEMATURIA: ICD-10-CM

## 2024-01-17 DIAGNOSIS — N39.41 URGE INCONTINENCE: Primary | ICD-10-CM

## 2024-01-17 DIAGNOSIS — R82.90 URINE FINDING: ICD-10-CM

## 2024-01-17 LAB
APPEARANCE: CLEAR
BILIRUBIN: NEGATIVE
GLUCOSE (URINE DIPSTICK): NEGATIVE MG/DL
KETONES (URINE DIPSTICK): NEGATIVE MG/DL
LEUKOCYTES: NEGATIVE
MULTISTIX LOT#: ABNORMAL NUMERIC
NITRITE, URINE: NEGATIVE
PH, URINE: 5.5 (ref 4.5–8)
PROTEIN (URINE DIPSTICK): NEGATIVE MG/DL
SPECIFIC GRAVITY: 1.01 (ref 1–1.03)
URINE-COLOR: YELLOW
UROBILINOGEN,SEMI-QN: 0.2 MG/DL (ref 0–1.9)

## 2024-01-17 PROCEDURE — 99213 OFFICE O/P EST LOW 20 MIN: CPT | Performed by: PHYSICIAN ASSISTANT

## 2024-01-17 PROCEDURE — 81003 URINALYSIS AUTO W/O SCOPE: CPT | Performed by: PHYSICIAN ASSISTANT

## 2024-01-17 RX ORDER — MIRABEGRON 50 MG/1
50 TABLET, FILM COATED, EXTENDED RELEASE ORAL DAILY
Qty: 90 TABLET | Refills: 3 | Status: SHIPPED | OUTPATIENT
Start: 2024-01-17

## 2024-01-17 NOTE — PROGRESS NOTES
Subjective:   Rupali Alexis is a 73 year old female who presents for follow up.    Seen originally in October for UUI complaints. Diagnosed with microscopic hematuria and completed CT/cysto for evaluation that was negative. She was started on Gemtesa and had outbreak of her rosacea that seemed to correlate with the time that she started the medication. She had been noticing significant improvement with her symptom within just a few days but essentially stopped the Gemtesa due to skin changes. Has resumed and taken only a few times without issue but the cost is quite significant.      UA trace blood, PVR 85mL.     History/Other:    Chief Complaint Reviewed and Verified  Nursing Notes Reviewed and   Verified  Allergies Reviewed  Medications Reviewed         Current Outpatient Medications   Medication Sig Dispense Refill    Cetirizine HCl (ZYRTEC) 10 MG Oral Chew Tab 1 tab Oral      omega-3 fatty acids 1000 MG Oral Cap 1 cap(s) orally Once a day      meclizine 25 MG Oral Tab Take 1 tablet (25 mg total) by mouth 3 (three) times daily as needed. 30 tablet 0    AZELASTINE HCL 0.1 % Nasal Solution USE 2 SPRAYS IN EACH NOSTRIL TWICE DAILY 30 mL 3    FLUTICASONE FUROATE IN 1 spray(s) intranasally once a day      Magnesium 100 MG Oral Tab Take 1.2 tablets (120 mg total) by mouth 2 (two) times daily as needed.      ALPRAZolam 0.25 MG Oral Tab Take 1 tablet (0.25 mg total) by mouth nightly as needed.      Clindamycin Phosphate 1 % External Gel apply to face qd 60 g 2    Zolpidem Tartrate 5 MG Oral Tab TK 1 T PO HS PRN FOR 30 DAYS  2    Multiple Vitamins-Minerals (MULTI VITAMIN/MINERALS OR) None Entered      GLUCOSAMINE CHONDR 1500 COMPLX OR None Entered      Vibegron (GEMTESA) 75 MG Oral Tab Take 75 mg by mouth daily. 90 tablet 3       Review of Systems:  Pertinent items are noted in HPI.      Objective:   There were no vitals taken for this visit. Estimated body mass index is 29.32 kg/m² as calculated from the  following:    Height as of 1/14/22: 5' 7.5\" (1.715 m).    Weight as of 8/27/23: 190 lb (86.2 kg).    No distress  Non labored respirations    Laboratory Data:  Lab Results   Component Value Date    WBC 6.6 08/27/2023    HGB 14.2 08/27/2023    .0 08/27/2023     Lab Results   Component Value Date     08/27/2023    K 3.8 08/27/2023     08/27/2023    CO2 26.0 08/27/2023    BUN 12 08/27/2023     (H) 08/27/2023    GFRAA 69 01/08/2020    AST 12 (L) 08/27/2023    ALT 25 08/27/2023    TP 7.0 08/27/2023    ALB 3.4 08/27/2023    CA 8.7 08/27/2023       Urinalysis Results (last three years):  Recent Labs     10/25/23  1325 10/25/23  1418 11/09/23  1341 01/17/24  1111   SPECGRAVITY 1.025  --  1.010 1.015   PHURINE 5.5  --  6.5 5.5   NITRITE Negative  --  Negative Negative   WBCUR  --  1-5  --   --    RBCUR  --  3-5*  --   --    BACUR  --  None Seen  --   --        Urine Culture Results (last three years):  Lab Results   Component Value Date    URINECUL No Growth at 18-24 hrs. 05/03/2017    URINECUL No Growth at 18-24 hrs. 03/21/2017    URINECUL No Growth at 18-24 hrs. 10/20/2016       Imaging  No results found.    Assessment & Plan:   UUI  Had success with Gemtesa 75mg within a few doses, but not covered well by insurance  Provided samples of Gemtesa x 2 weeks  Reviewed consideration to see if Myrbetriq covered by insurance vs trial of anticholinergic (preferred trospium due to concern for cognition)  If myrbetriq covered will continue on 50mg daily and follow up in 6 months.    Microscopic hematuria   S/p negative work up 11/2023  Monitor, if increased microscopic hematuria or gross hematuria will need repeat evaluation    Charmaine Shea PA-C, 1/17/2024

## 2024-04-18 ENCOUNTER — OFFICE VISIT (OUTPATIENT)
Dept: NEUROLOGY | Facility: CLINIC | Age: 74
End: 2024-04-18
Payer: MEDICARE

## 2024-04-18 VITALS
HEART RATE: 79 BPM | BODY MASS INDEX: 29 KG/M2 | RESPIRATION RATE: 16 BRPM | WEIGHT: 190 LBS | DIASTOLIC BLOOD PRESSURE: 60 MMHG | SYSTOLIC BLOOD PRESSURE: 118 MMHG

## 2024-04-18 DIAGNOSIS — G62.9 NEUROPATHY: Primary | ICD-10-CM

## 2024-04-18 DIAGNOSIS — M79.671 FOOT PAIN, BILATERAL: ICD-10-CM

## 2024-04-18 DIAGNOSIS — M79.672 FOOT PAIN, BILATERAL: ICD-10-CM

## 2024-04-18 DIAGNOSIS — R73.01 IMPAIRED FASTING BLOOD SUGAR: ICD-10-CM

## 2024-04-18 DIAGNOSIS — R29.2 BRISK DEEP TENDON REFLEXES: ICD-10-CM

## 2024-04-18 PROCEDURE — 99215 OFFICE O/P EST HI 40 MIN: CPT | Performed by: OTHER

## 2024-04-18 RX ORDER — GABAPENTIN 100 MG/1
CAPSULE ORAL
Qty: 90 CAPSULE | Refills: 0 | Status: SHIPPED | OUTPATIENT
Start: 2024-04-18

## 2024-04-18 NOTE — PATIENT INSTRUCTIONS
Refill policies:    Allow 2-3 business days for refills; controlled substances may take longer.  Contact your pharmacy at least 5 days prior to running out of medication and have them send an electronic request or submit request through the “request refill” option in your [employer] account.  Refills are not addressed on weekends; covering physicians do not authorize routine medications on weekends.  No narcotics or controlled substances are refilled after noon on Fridays or by on call physicians.  By law, narcotics must be electronically prescribed.  A 30 day supply with no refills is the maximum allowed.  If your prescription is due for a refill, you may be due for a follow up appointment.  To best provide you care, patients receiving routine medications need to be seen at least once a year.  Patients receiving narcotic/controlled substance medications need to be seen at least once every 3 months.  In the event that your preferred pharmacy does not have the requested medication in stock (e.g. Backordered), it is your responsibility to find another pharmacy that has the requested medication available.  We will gladly send a new prescription to that pharmacy at your request.    Scheduling Tests:    If your physician has ordered radiology tests such as MRI or CT scans, please contact Central Scheduling at [phone] right away to schedule the test.  Once scheduled, the [facility] Centralized Referral Team will work with your insurance carrier to obtain pre-certification or prior authorization.  Depending on your insurance carrier, approval may take 3-10 days.  It is highly recommended patients assure they have received an authorization before having a test performed.  If test is done without insurance authorization, patient may be responsible for the entire amount billed.      Precertification and Prior Authorizations:  If your physician has recommended that you have a procedure or additional testing performed the [facility]  Centralized Referral Team will contact your insurance carrier to obtain pre-certification or prior authorization.    You are strongly encouraged to contact your insurance carrier to verify that your procedure/test has been approved and is a COVERED benefit.  Although the [facility] Centralized Referral Team does its due diligence, the insurance carrier gives the disclaimer that \"Although the procedure is authorized, this does not guarantee payment.\"    Ultimately the patient is responsible for payment.   Thank you for your understanding in this matter.  Paperwork Completion:  If you require FMLA or disability paperwork for your recovery, please make sure to either drop it off or have it faxed to our office at [phone]. Be sure the form has your name and date of birth on it.  The form will be faxed to our Forms Department and they will complete it for you.  There is a 25$ fee for all forms that need to be filled out.  Please be aware there is a 10-14 day turnaround time.  You will need to sign a release of information (TOM) form if your paperwork does not come with one.  You may call the Forms Department with any questions at [phone].  Their fax number is [phone].

## 2024-04-18 NOTE — PROGRESS NOTES
[facility] Progress Note    HPI  Chief Complaint   Patient presents with    Neuropathy     6 month follow up and C/O of getting worse with pain and burning in feet weak ankles. Patient states that she has a lot of spider veins. Sensory neuropathic cough and wants to know if she will have another EMG or cortisone injections for pain    Neurologic Problem     C/O of let eye twitching       As per my initial H&P from 8/28/2023,   \" Rupali Alexis is a 72 year old, who presents for evaluation of vertigo.     Patient is here for evaluation of vertigo.  She states yesterday she woke up felt that the room was spinning; noted rapid spinning and would feel nauseated with feeling unsteady - she took meclizine and noted some improvement but still felt off balance and went to ER and had CT Brain 8/27/2023. She denied associated loss of vision, double vision, or ringing in ears or focal numbness/tingling/weakness.  She did have a \"pressure headache\" and CT brain was normal. She did not have loss of awareness and denies history of migraine headaches. She has had this occur 3-4 times in the past with improvement in a day or so.      She also is here for \"neuropathy\" symptoms which have been occurring for the past 2-3 yrs. She notes some tingling and \"burning\" intermittently in her toes, more in the big toe on R more than L side but denies any falls or balance issues; she largely does not have pain but mainly notes numbness; she has some \"sciatica\" on R leg as well.  She denies tripping over feet as well; she admits mother has \"neuropathy\" as well and has some flat feet.  Otherwise, patient denies any recent weight change, fevers, chills, nausea, double vision/ blurry vision / loss of vision, chest pain, palpitations, shortness of breath, rashes, joint pains, bowel / bladder incontinence or mood issues.\"       Prior notes as per 11/15/2023.  Patient last seen 8/28/2023.  She has been doing better than last  visit; no falls and no worsening of numbness/tingling in feet or hands but has some intermittent tingling / pain in R toe mainly at night.       Patient last seen 11/15/2023.  Since last visit, she feels her neuropathy symptoms have been worsening. Previously, she was mainly having pain/tingling in R foot but in the past 2 months, she notes she has been having worsening symptoms on left foot and more burning / tingling sensation in both feet overall. She notes this worse at night especially when lying on left side but denies back pain. She also admits to urinary urge incontinence but this has been chronic; no bowel changes or incontinence.    She occasionally has some twitching in the left eye and some tingling in finger tips. She denies falls or worsening balance. She denies any weakness in the hands or dropping objects from hands.  She additionally denies tripping over her feet.            Past Medical History:    Allergic rhinitis    Anxiety state    Esophageal reflux    Fibromyalgia    Pneumonia    Seasonal allergies    Sensory neuropathy    cough    Sleep apnea     Past Surgical History:   Procedure Laterality Date    Cataract      Colonoscopy  2015    Dr. Ross, polyps    Colonoscopy  01/14/2022    2 hepatic flexure polyps    Design custom breast implant  1980    Breast augmentation    Egd      Dr. Ross    Egd  01/14/2022    normal    Egd N/A 1/14/2022    Procedure: ESOPHAGOGASTRODUODENOSCOPY, COLONOSCOPY, with polypectomy;  Surgeon: Scar Guerrero MD;  [facility] [place] [facility]    Implant left      Implant right      Leep  11/2000    Upper gi endoscopy,exam       Family History   Problem Relation Age of Onset    Hypertension Father     High Cholesterol Father     Other (Atrial Fib) Father     Other (DVT) Father     Hypertension Mother     High Cholesterol Mother     Glaucoma Mother     Other (Other) Mother     Other (Kidney stones) Mother     Other (Ulcers) Mother     Other (Coronary artery  disease) Paternal Grandfather     Cancer Paternal Grandmother         Lung CA    Other (Osteoporosis) Maternal Grandmother     Other (Gout) Maternal Grandmother     Arthritis Other         Siblings    Glaucoma Other         Siblings    Asthma Other         Siblings, Children    Hypertension Brother     Prostate Cancer Brother      Social History     Socioeconomic History    Marital status:    Tobacco Use    Smoking status: Never     Passive exposure: Never    Smokeless tobacco: Never   Vaping Use    Vaping status: Never Used   Substance and Sexual Activity    Alcohol use: No     Alcohol/week: 0.0 standard drinks of alcohol    Drug use: No    Sexual activity: Yes     Partners: Male   Other Topics Concern    Caffeine Concern Yes     Comment: 1 cup a day    Exercise Yes     Comment: 3 x week       Allergies   Allergen Reactions    Shrimp OTHER (SEE COMMENTS)     BREAKS OUT AROUND MOUTH    Sulfa Antibiotics RASH         Current Outpatient Medications:     gabapentin 100 MG Oral Cap, Start at 100 mg nightly x1 week, then increase to 200 mg nightly x1 week, then increase to 300 mg nightly and continue, Disp: 90 capsule, Rfl: 0    Cetirizine HCl (ZYRTEC) 10 MG Oral Chew Tab, 1 tab Oral, Disp: , Rfl:     omega-3 fatty acids 1000 MG Oral Cap, 1 cap(s) orally Once a day, Disp: , Rfl:     meclizine 25 MG Oral Tab, Take 1 tablet (25 mg total) by mouth 3 (three) times daily as needed., Disp: 30 tablet, Rfl: 0    AZELASTINE HCL 0.1 % Nasal Solution, USE 2 SPRAYS IN EACH NOSTRIL TWICE DAILY, Disp: 30 mL, Rfl: 3    FLUTICASONE FUROATE IN, 1 spray(s) intranasally once a day, Disp: , Rfl:     Magnesium 100 MG Oral Tab, Take 1.2 tablets (120 mg total) by mouth 2 (two) times daily as needed., Disp: , Rfl:     ALPRAZolam 0.25 MG Oral Tab, Take 1 tablet (0.25 mg total) by mouth nightly as needed., Disp: , Rfl:     Clindamycin Phosphate 1 % External Gel, apply to face qd, Disp: 60 g, Rfl: 2    Zolpidem Tartrate 5 MG Oral Tab, TK 1  T PO HS PRN FOR 30 DAYS, Disp: , Rfl: 2    Multiple Vitamins-Minerals (MULTI VITAMIN/MINERALS OR), None Entered, Disp: , Rfl:     GLUCOSAMINE CHONDR 1500 COMPLX OR, None Entered, Disp: , Rfl:     Review of Systems:  No chest pain or palpitations; otherwise as noted in HPI.    Exam:  /60 (BP Location: Left arm, Patient Position: Sitting, Cuff Size: adult)   Pulse 79   Resp 16   Wt 190 lb (86.2 kg)   BMI 29.32 kg/m²   Estimated body mass index is 29.32 kg/m² as calculated from the following:    Height as of 1/14/22: 67.5\".    Weight as of this encounter: 190 lb (86.2 kg).    Gen: well developed, well nourished, no acute distress  HEENT: normocephalic  Heart; normal S1/S2, regular rate and rhythm  Lungs: clear to auscultation bilaterally  Extremities: no edema, peripheral pulses intact    Neck: supple, full range of motion; no carotid bruits    Fundoscopic Exam: optic discs sharp bilaterally    Neuro:  Mental status:  Orientation: Alert and oriented to person, place, time  Speech Fluent and conversational    CN: PERRL, EOMI with no nystagmus, VFF, smile symmetric, sensation intact, tongue and palate midline, SCM intact, otherwise, CN 2-12 intact  Motor: 5/5 strength throughout, tone normal  DTR: 3+ brisk but symmetric throughout, toes downgoing bilaterally, no clonus  Sensory: intact to light touch throughout; pin reduced R hand up to palm slightly; intact to pin R and left foot; absent vibration R LE at great toes and 8-10 sec on left great toe; proprioception intact  Coord: FNF intact with no tremor or dysmetria; rapid alternating movements intact bilaterally  Romberg: absent  Gait: gait mildly worsened; not ataxic but has some dragging of feet when walking bilaterally; not able to walk on heels or toes fully and not able to to tandem    Labs:  None new    Imaging:  None new    Other procedures  None new    Prior as noted below    NCS/EMG (9/27/2023):         Sensory NCS      Nerve / Sites Rec. Site Onset  Lat Peak Lat NP Amp PP Amp Segments Distance Velocity Comment       ms ms µV µV   cm m/s     R Median - Dig II (Antidromic)      Wrist Index 2.66 3.44 38.0 63.4 Wrist - Index 14 53        Ref.   ?3.30 ?4.00 ?7.0 ?8.0 Ref.         R Ulnar - Dig V (Antidromic)      Wrist Dig V 2.08 2.97 38.9 49.9 Wrist - Dig V 12 58        Ref.   ?3.10 ?4.00 ?5.0 ?4.0 Ref.         R Radial - Superficial (Antidromic)      Forearm Wrist 1.72 2.29 36.5 40.8 Forearm - Wrist 10 58        Ref.   ?2.20 ?2.80 ?7.0 ?11.0 Ref.         R Sural - (Antidromic)      Calf Ankle 2.76 3.44 2.6 4.4 Calf - Ankle 14 51        Ref.   ?3.60 ?4.50 ?4.0 ?4.0 Ref.             Motor NCS      Nerve / Sites Muscle Latency Amplitude Segments Dist. Lat Diff Velocity Comments       ms mV   cm ms m/s     R Median - APB      Wrist APB 3.52 8.7 Wrist - APB 7            Ref.   ?4.40 ?3.8 Ref.              Elbow APB 7.83 8.7 Elbow - Wrist 23 4.31 53.3        Ref.       Ref.     ?51.0     R Ulnar - ADM      Wrist ADM 2.77 8.0 Wrist - ADM 7            Ref.   ?3.70 ?7.9 Ref.              B.Elbow ADM 6.46 7.8 B.Elbow - Wrist 21.5 3.69 58.3        Ref.       Ref.     ?52.0     R Peroneal - EDB      Ankle EDB 5.04 2.7 Ankle - EDB 8            Ref.   ?6.50 ?1.1 Ref.              B. Fib Head EDB 12.94 2.4 B. Fib Head - Ankle 34 7.90 43.1        Ref.       Ref.     ?36.0     R Tibial - AH      Ankle AH 4.92 8.3 Ankle - AH 8            Ref.   ?6.10 ?1.1 Ref.              Knee AH 14.54 4.6 Knee - Ankle 40 9.62 41.6        Ref.       Ref.     ?34.0         F  Wave      Nerve M Latency F Latency     ms ms   R Peroneal - EDB 5.5 52.1   Ref.   ?63.6   R Tibial - AH 5.5 57.8   Ref.   ?61.1   R Median - APB 3.9 26.8   Ref.   ?31.5   R Ulnar - ADM 3.2 27.9   Ref.   ?30.9                   EMG Summary Table       Spontaneous MUAP Recruitment   Muscle IA Fib PSW Fasc H.F. Amp Dur. PPP Pattern   R. Deltoid N None None None None N N N N   R. Biceps brachii N None None None None N N N N   R.  Triceps brachii N None None None None N N N N   R. Extensor digitorum communis N None None None None N N N N   R. First dorsal interosseous N None None None None N N N N   R. Vastus medialis N None None None None N N N N   R. Peroneus longus N None None None None N N N N   R. Tibialis anterior N None None None None N N N N   R. Gastrocnemius N None None None None N N N N   R. Extensor hallucis longus N None None None None N N N N       Summary     Nerve conduction studies:  Right sural sensory response was abnormal due to reduced amplitude with normal peak latency and normal conduction velocity.  Right median sensory response was normal.   Right ulnar sensory response was normal.   Right radial sensory response was normal.   Right common peroneal motor response was normal; F wave response was normal.   Right tibial motor response was normal; F wave response was normal.   Right median motor response was normal; F wave response was normal.   Right ulnar motor response was normal; F wave response was normal.         EMG (needle exam):  The needle EMG study was normal in all 10 tested muscles: R. Deltoid, R. Biceps brachii, R. Triceps brachii, R. Extensor digitorum communis, R. First dorsal interosseous, R. Vastus medialis, R. Peroneus longus, R. Tibialis anterior, R. Gastrocnemius, R. Extensor hallucis longus.           Conclusion:   This is an abnormal study. There is electrophysiologic evidence suggestive of underlying large fiber polyneuropathy. This appears mild with axonal and sensory involvement and chronic without active or ongoing denervation and without evidence for a primary demyelinating neuropathy.  In addition, there is no suggestion of myopathy.              Impression/ Plan:  Rupali Alexis is a 73 year old female, who originally presented for evaluation of vertigo and \"neuropathy\" symptoms 8/28/2023.        Patient states she has been having neuropathy symptoms, which have been occurring for the past  2-3 yrs. She notes some tingling and \"burning\" intermittently in her toes, more in the big toe on R more than L side but denies any falls or balance issues. Previously, NCS/EMG completed and showed evident for mild axonal polyneuropathy but no evidence for primary demyelinating neuropathy.   She has noted gradually worsening pain in both feet more recently especially in left foot - exam is largely stable aside from worsened gait but given progression to left foot, as well as some decreased sensation in R hand up to palm, will repeat NCS/EMG both legs and R arm to evaluate for interval change. In addition, will check B12, monoclonal protein study and HgbA1C to evaluate for potential causes of neuropathy as her blood glucose was elevated on labs previously.     For treatment of neuropathic pain, recommend start gabapentin - Start at 100 mg nightly x1 week, then increase to 200 mg nightly x1 week, then increase to 300 mg nightly with plan to increase further as tolerated / needed; patient was advised of potential side effects, including but not limited to rash, excessive sedation, weight gain and/or peripheral edema; patient was advised to notify office with any new or worsening symptoms.    Otherwise, she was advised to follow up with podiatry and/or PCP regarding foot pain as well.       1. Neuropathy  As noted above   - Vitamin B12  - Monoclonal Protein Study; Future  - Hemoglobin A1C; Future  - EMG (LANE [facility]); Future  - gabapentin 100 MG Oral Cap; Start at 100 mg nightly x1 week, then increase to 200 mg nightly x1 week, then increase to 300 mg nightly and continue  Dispense: 90 capsule; Refill: 0    2. Foot pain, bilateral  As noted above   - Vitamin B12  - Monoclonal Protein Study; Future  - Hemoglobin A1C; Future  - EMG (L[facility]); Future    3. Brisk deep tendon reflexes  As noted above   - Vitamin B12  - Monoclonal Protein Study; Future  - Hemoglobin A1C; Future  - EMG (LANE [facility]); Future  -  gabapentin 100 MG Oral Cap; Start at 100 mg nightly x1 week, then increase to 200 mg nightly x1 week, then increase to 300 mg nightly and continue  Dispense: 90 capsule; Refill: 0    4. Impaired fasting blood sugar  As noted above   - Hemoglobin A1C; Future    40 total minutes spent, including chart review, discussion of pertinent labs and imaging with patient with discussion of differential diagnosis, and plan of care/workup as noted above;  patient allowed to ask questions and all questions answered to the best of my ability     Return in about 3 months (around 7/18/2024).    Zen Burger MD, Neurology  [facility]  Pager [phone]  4/18/2024

## 2024-04-20 ENCOUNTER — LAB ENCOUNTER (OUTPATIENT)
Dept: LAB | Facility: HOSPITAL | Age: 74
End: 2024-04-20
Attending: Other
Payer: MEDICARE

## 2024-04-20 DIAGNOSIS — M79.672 FOOT PAIN, BILATERAL: ICD-10-CM

## 2024-04-20 DIAGNOSIS — R29.2 BRISK DEEP TENDON REFLEXES: ICD-10-CM

## 2024-04-20 DIAGNOSIS — G62.9 NEUROPATHY: ICD-10-CM

## 2024-04-20 DIAGNOSIS — R73.01 IMPAIRED FASTING BLOOD SUGAR: ICD-10-CM

## 2024-04-20 DIAGNOSIS — M79.671 FOOT PAIN, BILATERAL: ICD-10-CM

## 2024-04-20 LAB
EST. AVERAGE GLUCOSE BLD GHB EST-MCNC: 120 MG/DL (ref 68–126)
HBA1C MFR BLD: 5.8 % (ref ?–5.7)
VIT B12 SERPL-MCNC: 457 PG/ML (ref 193–986)

## 2024-04-20 PROCEDURE — 83036 HEMOGLOBIN GLYCOSYLATED A1C: CPT

## 2024-04-20 PROCEDURE — 84165 PROTEIN E-PHORESIS SERUM: CPT

## 2024-04-20 PROCEDURE — 83521 IG LIGHT CHAINS FREE EACH: CPT

## 2024-04-20 PROCEDURE — 86334 IMMUNOFIX E-PHORESIS SERUM: CPT

## 2024-04-20 PROCEDURE — 36415 COLL VENOUS BLD VENIPUNCTURE: CPT

## 2024-04-20 PROCEDURE — 82607 VITAMIN B-12: CPT | Performed by: OTHER

## 2024-04-23 LAB
ALBUMIN SERPL ELPH-MCNC: 4.51 G/DL (ref 3.75–5.21)
ALBUMIN/GLOB SERPL: 1.68 {RATIO} (ref 1–2)
ALPHA1 GLOB SERPL ELPH-MCNC: 0.27 G/DL (ref 0.19–0.46)
ALPHA2 GLOB SERPL ELPH-MCNC: 0.58 G/DL (ref 0.48–1.05)
B-GLOBULIN SERPL ELPH-MCNC: 0.86 G/DL (ref 0.68–1.23)
GAMMA GLOB SERPL ELPH-MCNC: 0.96 G/DL (ref 0.62–1.7)
KAPPA LC FREE SER-MCNC: 1.76 MG/DL (ref 0.33–1.94)
KAPPA LC FREE/LAMBDA FREE SER NEPH: 1.33 {RATIO} (ref 0.26–1.65)
LAMBDA LC FREE SERPL-MCNC: 1.33 MG/DL (ref 0.57–2.63)
PROT SERPL-MCNC: 7.2 G/DL (ref 5.7–8.2)

## 2024-05-06 ENCOUNTER — TELEPHONE (OUTPATIENT)
Dept: PODIATRY CLINIC | Facility: CLINIC | Age: 74
End: 2024-05-06

## 2024-05-06 ENCOUNTER — OFFICE VISIT (OUTPATIENT)
Dept: PODIATRY CLINIC | Facility: CLINIC | Age: 74
End: 2024-05-06

## 2024-05-06 DIAGNOSIS — M19.071 ARTHROSIS OF MIDFOOT, RIGHT: Primary | ICD-10-CM

## 2024-05-06 DIAGNOSIS — Q66.221 METATARSUS ADDUCTUS OF BOTH FEET: ICD-10-CM

## 2024-05-06 DIAGNOSIS — Q66.222 METATARSUS ADDUCTUS OF BOTH FEET: ICD-10-CM

## 2024-05-06 DIAGNOSIS — M19.072 ARTHROSIS OF MIDFOOT, LEFT: ICD-10-CM

## 2024-05-06 PROCEDURE — 99203 OFFICE O/P NEW LOW 30 MIN: CPT | Performed by: PODIATRIST

## 2024-05-06 NOTE — TELEPHONE ENCOUNTER
Pt came in checking on if she could get new orthotics but will have old ones refurbished for a cost to pt of $91.00 that has not been paid for at time of appointment.

## 2024-05-09 ENCOUNTER — PROCEDURE VISIT (OUTPATIENT)
Dept: NEUROLOGY | Facility: CLINIC | Age: 74
End: 2024-05-09
Payer: MEDICARE

## 2024-05-09 DIAGNOSIS — M79.672 FOOT PAIN, BILATERAL: ICD-10-CM

## 2024-05-09 DIAGNOSIS — R29.2 BRISK DEEP TENDON REFLEXES: ICD-10-CM

## 2024-05-09 DIAGNOSIS — R20.0 NUMBNESS IN FEET: ICD-10-CM

## 2024-05-09 DIAGNOSIS — M79.671 FOOT PAIN, BILATERAL: ICD-10-CM

## 2024-05-09 DIAGNOSIS — G62.9 NEUROPATHY: Primary | ICD-10-CM

## 2024-05-09 NOTE — PROCEDURES
[facility]  [address], [place] [address]   [phone]        Full Name: Rupali Alexis Gender: Female  Patient ID: [id number] YOB: 1950      Visit Date: 5/9/2024 11:08 AM  Age: 73 Years  Examining Physician: ROSEANN LEMUS MD  Height: 5 feet 7 inch  Weight: 190 lbs  History:    Focused HPI:   This is a 73 year old female, who originally presented for evaluation of vertigo and \"neuropathy\" symptoms 8/28/2023.        Patient states she has been having neuropathy symptoms, which have been occurring for the past 2-3 yrs. She notes some tingling and \"burning\" intermittently in her toes, more in the big toe on R more than L side but denies any falls or balance issues. Previously, NCS/EMG completed and showed evident for mild axonal polyneuropathy but no evidence for primary demyelinating neuropathy.   She has noted gradually worsening pain in both feet more recently especially in left foot - exam is largely stable aside from worsened gait but given progression to left foot, as well as some decreased sensation in R hand up to palm, will repeat NCS/EMG both legs and R arm to evaluate for interval change.    Focused Exam:   Motor: 5/5 strength throughout, tone normal  DTR: 3+ brisk but symmetric throughout, toes downgoing bilaterally, no clonus  Sensory: intact to light touch throughout; pin reduced R hand up to palm slightly; intact to pin R and left foot; absent vibration R LE at great toes and 8-10 sec on left great toe; proprioception intact      Sensory NCS      Nerve / Sites Rec. Site Onset Lat Peak Lat NP Amp PP Amp Segments Distance Velocity Comment     ms ms µV µV  cm m/s    R Median - Dig II (Antidromic)      Wrist Index 2.45 3.33 36.0 55.3 Wrist - Index 14 57       Ref.  ?3.30 ?4.00 ?7.0 ?8.0 Ref.      R Ulnar - Dig V (Antidromic)      Wrist Dig V 2.08 2.97 37.9 43.4 Wrist - Dig V 12 58       Ref.  ?3.10 ?4.00 ?5.0 ?4.0 Ref.      R Radial -  Superficial (Antidromic)      Forearm Wrist 1.67 2.19 39.8 35.3 Forearm - Wrist 10 60       Ref.  ?2.20 ?2.80 ?7.0 ?11.0 Ref.      R Sural - (Antidromic)      Calf Ankle NR NR NR NR Calf - Ankle 14 NR       Ref.  ?3.60 ?4.50 ?4.0 ?4.0 Ref.      L Sural - (Antidromic)      Calf Ankle NR NR NR NR Calf - Ankle 14 NR       Ref.  ?3.60 ?4.50 ?4.0 ?4.0 Ref.          Motor NCS      Nerve / Sites Muscle Latency Amplitude Segments Dist. Lat Diff Velocity Comments     ms mV  cm ms m/s    R Median - APB      Wrist APB 3.63 7.8 Wrist - APB 7         Ref.  ?4.40 ?3.8 Ref.          Elbow APB 8.06 7.7 Elbow - Wrist 23.5 4.44 53.0       Ref.    Ref.   ?51.0    R Ulnar - ADM      Wrist ADM 2.73 7.3 Wrist - ADM 7         Ref.  ?3.70 ?7.9 Ref.          B.Elbow ADM 6.50 7.3 B.Elbow - Wrist 22 3.77 58.3       Ref.    Ref.   ?52.0    R Peroneal - EDB      Ankle EDB 4.08 2.3 Ankle - EDB 7         Ref.  ?6.50 ?1.1 Ref.          B. Fib Head EDB 13.48 1.8 B. Fib Head - Ankle 34 9.40 36.2       Ref.    Ref.   ?36.0    L Peroneal - EDB      Ankle EDB 4.21 3.4 Ankle - EDB 7         Ref.  ?6.50 ?1.1 Ref.          B. Fib Head EDB 11.92 3.0 B. Fib Head - Ankle 32.5 7.71 42.2       Ref.    Ref.   ?36.0    R Tibial - AH      Ankle AH 5.02 5.3 Ankle - AH 8         Ref.  ?6.10 ?1.1 Ref.          Knee AH 14.88 3.4 Knee - Ankle 38 9.85 38.6       Ref.    Ref.   ?34.0    L Tibial - AH      Ankle AH 5.17 4.3 Ankle - AH 8         Ref.  ?6.10 ?1.1 Ref.          Knee AH 14.79 2.7 Knee - Ankle 38 9.63 39.5       Ref.    Ref.   ?34.0        F  Wave      Nerve M Latency F Latency    ms ms   R Peroneal - EDB 4.6 53.1   Ref.  ?63.6   R Tibial - AH 5.8 60.7   Ref.  ?61.1   L Tibial - AH 5.9 58.8   Ref.  ?61.1   L Peroneal - EDB 4.8 52.2   Ref.  ?63.6   R Median - APB 3.6 29.0   Ref.  ?31.5   R Ulnar - ADM 3.2 28.4   Ref.  ?30.9       EMG Summary Table     Spontaneous MUAP Recruitment   Muscle IA Fib PSW Fasc H.F. Amp Dur. PPP Pattern   R. Deltoid N None None None  None N N N N   R. Triceps brachii N None None None None N N N N   R. Biceps brachii N None None None None N N N N   R. Extensor digitorum communis N None None None None N N N N   R. First dorsal interosseous N None None None None N N N N   R. Vastus medialis N None None None None N N N N   L. Vastus medialis N None None None None N N N N   R. Tibialis anterior N None None None None N N N N   L. Tibialis anterior N None None None None N N N N   R. Peroneus longus N None None None None N N N N   L. Peroneus longus N None None None None N N N N   R. Gastrocnemius N None None None None N N N N   L. Gastrocnemius N None None None None N N N N   R. Extensor hallucis longus N None None None None N N N N   L. Extensor hallucis longus N None None None None N N N N       Summary    Nerve conduction studies:  Right sural sensory response was absent.  Left sural sensory response was absent.  Right median sensory response was normal.  Right ulnar sensory response was normal.  Right radial sensory response was normal.  Right common peroneal motor response was normal; F-wave response was normal.  Left common peroneal motor response was normal; F-wave response was normal.  Right tibial motor response was normal; F-wave response was normal.  Left tibial motor response was normal; F-wave response was normal.  Right median motor response was normal; F-wave response was normal.  Right ulnar motor response was normal; F-wave response was normal.    EMG (needle exam)  Concentric needle EMG study was normal in all 15 tested muscles: R. Deltoid, R. Triceps brachii, R. Biceps brachii, R. Extensor digitorum communis, R. First dorsal interosseous, R. Vastus medialis, L. Vastus medialis, R. Tibialis anterior, L. Tibialis anterior, R. Peroneus longus, L. Peroneus longus, R. Gastrocnemius, L. Gastrocnemius, R. Extensor hallucis longus, L. Extensor hallucis longus.            Conclusion:     This is an abnormal study. There is electrophysiologic  evidence suggestive of an underlying large fiber polyneuropathy. This appears mild with axonal and sensory involvement and chronic without active or ongoing denervation and without evidence for a primary demyelinating neuropathy.  In addition, there is no suggestion of myopathy. In comparison to prior study, this is mildly worsened but there is no evidence for neuropathy in the R upper extremity.     Zen Burger MD, Neurology  [facility]  Pager [phone]  5/9/2024

## 2024-05-16 NOTE — PROGRESS NOTES
Rupali Alexis is a 73 year old female.   Chief Complaint   Patient presents with    Consult     Bilateral feet- patient states she was diagnosed with neuropathy and deformity- patient denies pain0 burning sensation- red dots- check ankles-          HPI:   Patient presents complaining of bilateral foot pain.  Discomfort she has some degree of neuropathy that she has been diagnosed with.  She is also complaining some burning sensation she gets some red dots on her feet.  At today's visit reviewed nurse's history as taken above, allergies medications and medical history as documented below.  All changes duly noted  Allergies: Shrimp and Sulfa antibiotics   Current Outpatient Medications   Medication Sig Dispense Refill    gabapentin 100 MG Oral Cap Start at 100 mg nightly x1 week, then increase to 200 mg nightly x1 week, then increase to 300 mg nightly and continue 90 capsule 0    Cetirizine HCl (ZYRTEC) 10 MG Oral Chew Tab 1 tab Oral      omega-3 fatty acids 1000 MG Oral Cap 1 cap(s) orally Once a day      meclizine 25 MG Oral Tab Take 1 tablet (25 mg total) by mouth 3 (three) times daily as needed. 30 tablet 0    AZELASTINE HCL 0.1 % Nasal Solution USE 2 SPRAYS IN EACH NOSTRIL TWICE DAILY 30 mL 3    FLUTICASONE FUROATE IN 1 spray(s) intranasally once a day      Magnesium 100 MG Oral Tab Take 1.2 tablets (120 mg total) by mouth 2 (two) times daily as needed.      ALPRAZolam 0.25 MG Oral Tab Take 1 tablet (0.25 mg total) by mouth nightly as needed.      Clindamycin Phosphate 1 % External Gel apply to face qd 60 g 2    Zolpidem Tartrate 5 MG Oral Tab TK 1 T PO HS PRN FOR 30 DAYS  2    Multiple Vitamins-Minerals (MULTI VITAMIN/MINERALS OR) None Entered      GLUCOSAMINE CHONDR 1500 COMPLX OR None Entered        Past Medical History:    Allergic rhinitis    Anxiety state    Esophageal reflux    Fibromyalgia    Pneumonia    Seasonal allergies    Sensory neuropathy    cough    Sleep apnea      Past Surgical History:    Procedure Laterality Date    Cataract      Colonoscopy  2015    Dr. Ross, polyps    Colonoscopy  01/14/2022    2 hepatic flexure polyps    Design custom breast implant  1980    Breast augmentation    Egd      Dr. Ross    Egd  01/14/2022    normal    Egd N/A 1/14/2022    Procedure: ESOPHAGOGASTRODUODENOSCOPY, COLONOSCOPY, with polypectomy;  Surgeon: Scar Guerrero MD;  Location: [facility]    Implant left      Implant right      Leep  11/2000    Upper gi endoscopy,exam        Family History   Problem Relation Age of Onset    Hypertension Father     High Cholesterol Father     Other (Atrial Fib) Father     Other (DVT) Father     Hypertension Mother     High Cholesterol Mother     Glaucoma Mother     Other (Other) Mother     Other (Kidney stones) Mother     Other (Ulcers) Mother     Other (Coronary artery disease) Paternal Grandfather     Cancer Paternal Grandmother         Lung CA    Other (Osteoporosis) Maternal Grandmother     Other (Gout) Maternal Grandmother     Arthritis Other         Siblings    Glaucoma Other         Siblings    Asthma Other         Siblings, Children    Hypertension Brother     Prostate Cancer Brother       Social History     Socioeconomic History    Marital status:    Tobacco Use    Smoking status: Never     Passive exposure: Never    Smokeless tobacco: Never   Vaping Use    Vaping status: Never Used   Substance and Sexual Activity    Alcohol use: No     Alcohol/week: 0.0 standard drinks of alcohol    Drug use: No    Sexual activity: Yes     Partners: Male   Other Topics Concern    Caffeine Concern Yes     Comment: 1 cup a day    Exercise Yes     Comment: 3 x week           REVIEW OF SYSTEMS:   Today reviewed systens as documented below  GENERAL HEALTH: feels well otherwise  SKIN: Refer to exam below  RESPIRATORY: denies shortness of breath with exertion  CARDIOVASCULAR: denies chest pain on exertion  GI: denies abdominal pain and denies heartburn  NEURO:  denies headaches    EXAM:   There were no vitals taken for this visit.  GENERAL: well developed, well nourished, in no apparent distress  EXTREMITIES:   1. Integument: Skin on her feet is warm and moist.  She has prominence of the midfoot area on both feet.  It is sensitive to palpation.   2. Vascular: Patient has palpable pulses dorsalis pedis posterior tibial on the left and right foot that are symmetrical and equivocal brisk capillary turn to the pedal digits   3. Neurologic: Patient seems to have intact sensorium I cannot elicit a positive Tinel's test   4. Musculoskeletal: Patient has good muscle strength.  But the patient has mild metatarsus adductus.  X-rays were taken AP and lateral on the right foot showing that the patient does have a mild metatarsus adductus.  Mild osteoarthritic changes noted to the Lisfranc's joint area.  X-rays were taken AP and lateral on the left foot showing mild metatarsus adductus mild osteoarthritic changes noted to the Lisfranc's joint.  No fractures no dislocations.  Orthotics which we will have refurbished for her.  ASSESSMENT AND PLAN:   Diagnoses and all orders for this visit:    Arthrosis of midfoot, right    Arthrosis of midfoot, left    Metatarsus adductus of both feet    Other orders  -     [facility] AMB POD XR - RT FOOT 2 VIEWS(AP, LATERAL)WT BEARING  -     [facility] AMB POD XR - LT FOOT 2 VIEWS(AP, LATERAL)WT BEARING        Plan: I reviewed different treatments I think a pair of over-the-counter orthotics proper shoes would help.  She was given information about the new balance shoe store where she could be fit.  Serial follow-up as needed.  Patient old orthotics.    The patient indicates understanding of these issues and agrees to the plan.    Aamir Pace DPM

## 2024-05-30 DIAGNOSIS — G62.9 NEUROPATHY: ICD-10-CM

## 2024-05-30 DIAGNOSIS — R29.2 BRISK DEEP TENDON REFLEXES: ICD-10-CM

## 2024-05-30 NOTE — TELEPHONE ENCOUNTER
Medication: GABAPENTIN 100 MG      Date of last refill: 4/18/24 (#90/0R)  [place]     Last office visit: 4/18/24  Due back to clinic per last office note:  3 mon  Date next office visit scheduled:    No future appointments.        Last OV note recommendation:    Impression/ Plan:  Rupali Alexis is a 73 year old female, who originally presented for evaluation of vertigo and \"neuropathy\" symptoms 8/28/2023.        Patient states she has been having neuropathy symptoms, which have been occurring for the past 2-3 yrs. She notes some tingling and \"burning\" intermittently in her toes, more in the big toe on R more than L side but denies any falls or balance issues. Previously, NCS/EMG completed and showed evident for mild axonal polyneuropathy but no evidence for primary demyelinating neuropathy.   She has noted gradually worsening pain in both feet more recently especially in left foot - exam is largely stable aside from worsened gait but given progression to left foot, as well as some decreased sensation in R hand up to palm, will repeat NCS/EMG both legs and R arm to evaluate for interval change. In addition, will check B12, monoclonal protein study and HgbA1C to evaluate for potential causes of neuropathy as her blood glucose was elevated on labs previously.     For treatment of neuropathic pain, recommend start gabapentin - Start at 100 mg nightly x1 week, then increase to 200 mg nightly x1 week, then increase to 300 mg nightly with plan to increase further as tolerated / needed; patient was advised of potential side effects, including but not limited to rash, excessive sedation, weight gain and/or peripheral edema; patient was advised to notify office with any new or worsening symptoms.    Otherwise, she was advised to follow up with podiatry and/or PCP regarding foot pain as well.         1. Neuropathy  As noted above   - Vitamin B12  - Monoclonal Protein Study; Future  -  Hemoglobin A1C; Future  - EMG ([facility]); Future  - gabapentin 100 MG Oral Cap; Start at 100 mg nightly x1 week, then increase to 200 mg nightly x1 week, then increase to 300 mg nightly and continue  Dispense: 90 capsule; Refill: 0     2. Foot pain, bilateral  As noted above   - Vitamin B12  - Monoclonal Protein Study; Future  - Hemoglobin A1C; Future  - EMG ([facility]); Future     3. Brisk deep tendon reflexes  As noted above   - Vitamin B12  - Monoclonal Protein Study; Future  - Hemoglobin A1C; Future  - EMG ([facility]); Future  - gabapentin 100 MG Oral Cap; Start at 100 mg nightly x1 week, then increase to 200 mg nightly x1 week, then increase to 300 mg nightly and continue  Dispense: 90 capsule; Refill: 0     4. Impaired fasting blood sugar  As noted above   - Hemoglobin A1C; Future

## 2024-06-03 RX ORDER — GABAPENTIN 100 MG/1
CAPSULE ORAL
Qty: 90 CAPSULE | Refills: 0 | Status: SHIPPED | OUTPATIENT
Start: 2024-06-03

## 2024-06-11 ENCOUNTER — TELEPHONE (OUTPATIENT)
Dept: PODIATRY CLINIC | Facility: CLINIC | Age: 74
End: 2024-06-11

## 2024-07-11 ENCOUNTER — OFFICE VISIT (OUTPATIENT)
Dept: PODIATRY CLINIC | Facility: CLINIC | Age: 74
End: 2024-07-11
Payer: MEDICARE

## 2024-07-11 DIAGNOSIS — Q82.8 PUNCTATE POROKERATOSIS: ICD-10-CM

## 2024-07-11 DIAGNOSIS — L60.0 ONYCHOCRYPTOSIS: Primary | ICD-10-CM

## 2024-07-11 DIAGNOSIS — L60.1 TRAUMATIC ONYCHOLYSIS: ICD-10-CM

## 2024-07-11 PROCEDURE — 99213 OFFICE O/P EST LOW 20 MIN: CPT | Performed by: PODIATRIST

## 2024-07-11 NOTE — PROGRESS NOTES
Rupali Alexis is a 73 year old female.   Chief Complaint   Patient presents with    Toe Pain     Patient states that she fell a few days again and caught her right 5th digit nail. The nail is loose, denies any pain in the office today.    Foot Pain     On the bottom of left foot has a spot, is it callus or wart. Rate pain 3/10, she does have neuropathy.         HPI:   This pleasant patient presents to the clinic she has a complaint of a loose and fifth toenail after an injury on the right foot with a small corn on the bottom of her left and a painful ingrown toenail on the she points to the lateral nail edge of her right great toe.  At today's visit reviewed nurse's history as taken above, allergies medications and medical history as documented below.  All changes duly noted  Allergies: Shrimp and Sulfa antibiotics   Current Outpatient Medications   Medication Sig Dispense Refill    gabapentin 100 MG Oral Cap 1 capsule at night 90 capsule 0    Cetirizine HCl (ZYRTEC) 10 MG Oral Chew Tab 1 tab Oral      omega-3 fatty acids 1000 MG Oral Cap 1 cap(s) orally Once a day      meclizine 25 MG Oral Tab Take 1 tablet (25 mg total) by mouth 3 (three) times daily as needed. 30 tablet 0    AZELASTINE HCL 0.1 % Nasal Solution USE 2 SPRAYS IN EACH NOSTRIL TWICE DAILY 30 mL 3    FLUTICASONE FUROATE IN 1 spray(s) intranasally once a day      Magnesium 100 MG Oral Tab Take 1.2 tablets (120 mg total) by mouth 2 (two) times daily as needed.      ALPRAZolam 0.25 MG Oral Tab Take 1 tablet (0.25 mg total) by mouth nightly as needed.      Clindamycin Phosphate 1 % External Gel apply to face qd 60 g 2    Zolpidem Tartrate 5 MG Oral Tab TK 1 T PO HS PRN FOR 30 DAYS  2    Multiple Vitamins-Minerals (MULTI VITAMIN/MINERALS OR) None Entered      GLUCOSAMINE CHONDR 1500 COMPLX OR None Entered        Past Medical History:    Allergic rhinitis    Anxiety state    Esophageal reflux    Fibromyalgia    Pneumonia    Seasonal allergies    Sensory  neuropathy    cough    Sleep apnea      Past Surgical History:   Procedure Laterality Date    Cataract      Colonoscopy  2015    Dr. Ross, polyps    Colonoscopy  01/14/2022    2 hepatic flexure polyps    Design custom breast implant  1980    Breast augmentation    Egd      Dr. Ross    Egd  01/14/2022    normal    Egd N/A 1/14/2022    Procedure: ESOPHAGOGASTRODUODENOSCOPY, COLONOSCOPY, with polypectomy;  Surgeon: Scar Guerrero MD;  [facility]    Implant left      Implant right      Leep  11/2000    Upper gi endoscopy,exam        Family History   Problem Relation Age of Onset    Hypertension Father     High Cholesterol Father     Other (Atrial Fib) Father     Other (DVT) Father     Hypertension Mother     High Cholesterol Mother     Glaucoma Mother     Other (Other) Mother     Other (Kidney stones) Mother     Other (Ulcers) Mother     Other (Coronary artery disease) Paternal Grandfather     Cancer Paternal Grandmother         Lung CA    Other (Osteoporosis) Maternal Grandmother     Other (Gout) Maternal Grandmother     Arthritis Other         Siblings    Glaucoma Other         Siblings    Asthma Other         Siblings, Children    Hypertension Brother     Prostate Cancer Brother       Social History     Socioeconomic History    Marital status:    Tobacco Use    Smoking status: Never     Passive exposure: Never    Smokeless tobacco: Never   Vaping Use    Vaping status: Never Used   Substance and Sexual Activity    Alcohol use: No     Alcohol/week: 0.0 standard drinks of alcohol    Drug use: No    Sexual activity: Yes     Partners: Male   Other Topics Concern    Caffeine Concern Yes     Comment: 1 cup a day    Exercise Yes     Comment: 3 x week           REVIEW OF SYSTEMS:   Today reviewed systens as documented below  GENERAL HEALTH: feels well otherwise  SKIN: Refer to exam below  RESPIRATORY: denies shortness of breath with exertion  CARDIOVASCULAR: denies chest pain on  exertion  GI: denies abdominal pain and denies heartburn  NEURO: denies headaches    EXAM:   There were no vitals taken for this visit.  GENERAL: well developed, well nourished, in no apparent distress  EXTREMITIES:   1. Integument: The skin on both feet is warm and dry.  Her hallux nails do show mild thickening the lateral nail border of the right hallux is incurvated and painful with hyperkeratotic impaction.  The fifth toenail is loosened from the nailbed most of the way.  And in addition to that she has a painful porokeratosis just at the proximalmost aspect of the medial longitudinal arch on her left foot.   2. Vascular: The patient has palpable dorsalis pedis posterior tibial pulses bilateral   3. Neurologic: Patient has intact sensorium   4. Musculoskeletal: Patient has good muscle strength.    ASSESSMENT AND PLAN:   Diagnoses and all orders for this visit:    Onychocryptosis    Traumatic onycholysis    Punctate porokeratosis        Plan: Today explained the nature and extent of all these problems using a slant back technique trimmed down debrided the symptomatic nail edge on the left hallux.  Using a 15 blade and disposable curette remove the painful porokeratosis gave her home care instructions to keep it filed down on the left foot.  Utilizing a nail nippers trimmed off and debrided most of the loose and fifth toenail I told her the rest will come off naturally as the new nail grows in underneath it but there is no sign of infection bleeding or drainage at this visit follow-up as needed.    The patient indicates understanding of these issues and agrees to the plan.    Aamir Pace DPM

## 2024-11-12 ENCOUNTER — OFFICE VISIT (OUTPATIENT)
Dept: SURGERY | Facility: CLINIC | Age: 74
End: 2024-11-12

## 2024-11-12 DIAGNOSIS — N39.41 URGE INCONTINENCE: ICD-10-CM

## 2024-11-12 DIAGNOSIS — N32.81 OAB (OVERACTIVE BLADDER): Primary | ICD-10-CM

## 2024-11-12 DIAGNOSIS — R82.90 URINE FINDING: ICD-10-CM

## 2024-11-12 PROCEDURE — 99213 OFFICE O/P EST LOW 20 MIN: CPT | Performed by: PHYSICIAN ASSISTANT

## 2024-11-12 RX ORDER — VIT C/B6/B5/MAGNESIUM/HERB 173 50-5-6-5MG
CAPSULE ORAL
COMMUNITY
Start: 2023-08-01

## 2024-11-12 RX ORDER — FAMOTIDINE 10 MG
TABLET ORAL
COMMUNITY

## 2024-11-12 RX ORDER — MIRABEGRON 50 MG/1
50 TABLET, FILM COATED, EXTENDED RELEASE ORAL DAILY
Qty: 90 TABLET | Refills: 3 | Status: SHIPPED | OUTPATIENT
Start: 2024-11-12 | End: 2025-11-07

## 2024-11-12 RX ORDER — VIBEGRON 75 MG/1
1 TABLET, FILM COATED ORAL DAILY
COMMUNITY
Start: 2024-11-04

## 2024-11-12 NOTE — PROGRESS NOTES
Subjective:   Rupali Alexis is a 73 year old female with hx of SULEIMAN, HL, neuropathy, GERD, OA, who presents for follow up OAB.     Seen originally in October 2023 for UUI complaints. Diagnosed with microscopic hematuria and completed CT/cysto for evaluation that was negative. She was started on Gemtesa and had outbreak of her rosacea that seemed to correlate with the time that she started the medication. She had been noticing significant improvement with her symptom within just a few days but essentially stopped the Gemtesa due to skin changes.     At last visit in January 2024, we discussed switching from gemtesa to myrbetriq due to cost. She ended up continuing the gemtesa 75mg daily with improvement down to 1 ppd, but at some point discontinued because of the cost. She resumed 2-3 weeks ago and has had less than satisfactory results thus far.    Baseline LUTS noted in October 2023:  Daytime frequency q 3-4 hours  Nocturia x 0   Pads per day: 4+ thick pads  Leakage with strong urge.    Failed PFT previously.    History/Other:    No Further Nursing Notes to Review         Current Outpatient Medications   Medication Sig Dispense Refill    gabapentin 100 MG Oral Cap 1 capsule at night 90 capsule 0    Cetirizine HCl (ZYRTEC) 10 MG Oral Chew Tab 1 tab Oral      omega-3 fatty acids 1000 MG Oral Cap 1 cap(s) orally Once a day      meclizine 25 MG Oral Tab Take 1 tablet (25 mg total) by mouth 3 (three) times daily as needed. 30 tablet 0    AZELASTINE HCL 0.1 % Nasal Solution USE 2 SPRAYS IN EACH NOSTRIL TWICE DAILY 30 mL 3    FLUTICASONE FUROATE IN 1 spray(s) intranasally once a day      Magnesium 100 MG Oral Tab Take 1.2 tablets (120 mg total) by mouth 2 (two) times daily as needed.      ALPRAZolam 0.25 MG Oral Tab Take 1 tablet (0.25 mg total) by mouth nightly as needed.      Clindamycin Phosphate 1 % External Gel apply to face qd 60 g 2    Zolpidem Tartrate 5 MG Oral Tab TK 1 T PO HS PRN FOR 30 DAYS  2    Multiple  Vitamins-Minerals (MULTI VITAMIN/MINERALS OR) None Entered      GLUCOSAMINE CHONDR 1500 COMPLX OR None Entered         Review of Systems:  Pertinent items are noted in HPI.      Objective:   There were no vitals taken for this visit. Estimated body mass index is 29.32 kg/m² as calculated from the following:    Height as of 1/14/22: 5' 7.5\" (1.715 m).    Weight as of 4/18/24: 190 lb (86.2 kg).    No distress  Non labored respirations    Laboratory Data:  Lab Results   Component Value Date    WBC 6.6 08/27/2023    HGB 14.2 08/27/2023    .0 08/27/2023     Lab Results   Component Value Date     08/27/2023    K 3.8 08/27/2023     08/27/2023    CO2 26.0 08/27/2023    BUN 12 08/27/2023     (H) 08/27/2023    GFRAA 69 01/08/2020    AST 12 (L) 08/27/2023    ALT 25 08/27/2023    TP 7.2 04/20/2024    ALB 4.51 04/20/2024    CA 8.7 08/27/2023       Urinalysis Results (last three years):  Recent Labs     10/25/23  1325 10/25/23  1418 11/09/23  1341 01/17/24  1111   SPECGRAVITY 1.025  --  1.010 1.015   PHURINE 5.5  --  6.5 5.5   NITRITE Negative  --  Negative Negative   WBCUR  --  1-5  --   --    RBCUR  --  3-5*  --   --    BACUR  --  None Seen  --   --        Urine Culture Results (last three years):  Lab Results   Component Value Date    URINECUL No Growth at 18-24 hrs. 05/03/2017    URINECUL No Growth at 18-24 hrs. 03/21/2017    URINECUL No Growth at 18-24 hrs. 10/20/2016       Imaging  No results found.    Assessment & Plan:   OAB with UUI  Recommend she continue gemtesa 75mg at this time, likely insufficient duration of use currently is leading to unsatisfactory results given prior extended use was improved down to 1 ppd. We also discussed consideration to switch to myrbetriq 50mg daily - script sent to pharmacy to see if cost is more manageable.   She also could consider dual therapy with trospium and beta agonist if needed.  Advised patient to take only gemtesa OR myrbetriq, but either ok, just  dependent on the cost for patient.     Microscopic hematuria  S/p negative work up 11/2023  Continue monitor, repeat evaluation if gross hematuria      Charmaine Shea PA-C, 11/12/2024

## 2024-12-06 ENCOUNTER — TELEPHONE (OUTPATIENT)
Dept: SURGERY | Facility: CLINIC | Age: 74
End: 2024-12-06

## 2024-12-06 NOTE — TELEPHONE ENCOUNTER
Pt has not received a call back.  Question on medication.  Rx. Gemtesa stopping.  Starting rx.  myrbetriq.   Please call

## 2025-02-28 ENCOUNTER — TELEPHONE (OUTPATIENT)
Dept: PHYSICAL THERAPY | Facility: HOSPITAL | Age: 75
End: 2025-02-28

## 2025-02-28 ENCOUNTER — TELEPHONE (OUTPATIENT)
Dept: NEUROLOGY | Facility: CLINIC | Age: 75
End: 2025-02-28

## 2025-02-28 NOTE — TELEPHONE ENCOUNTER
Needs appt to assess - see pcp for dizzy concerns in interim - I was following for neuropathy - this is new issue

## 2025-02-28 NOTE — TELEPHONE ENCOUNTER
Patient calling she is having dizziness and wants to know if Dr. Burger can order PT, he had order pt before.

## 2025-02-28 NOTE — TELEPHONE ENCOUNTER
LOV 4/18/24. Patient returned for EMG study 5/9/24.    Patient had previously received PT for BPPV 8/2023.    Will request consideration from Dr. Burger; unsure if follow-up appointment would be required.

## 2025-02-28 NOTE — TELEPHONE ENCOUNTER
Patient's  called to let PT know that his wife began experiencing dizziness this morning, has had to lay down and not move very much. Was just on a plane. She is wondering if she can come in for PT. Let patient know that she will need an order for PT to schedule therapy. Advised to call Dr. Burger's office to see if he will place an order or needs her to come in to be seen. If cannot reach him, can try to call PCP. If able to get order, call back to schedule PT as able.

## 2025-03-19 ENCOUNTER — TELEPHONE (OUTPATIENT)
Dept: SURGERY | Facility: CLINIC | Age: 75
End: 2025-03-19

## 2025-03-19 RX ORDER — MIRABEGRON 50 MG/1
50 TABLET, FILM COATED, EXTENDED RELEASE ORAL DAILY
Qty: 90 TABLET | Refills: 3 | Status: SHIPPED | OUTPATIENT
Start: 2025-03-19 | End: 2026-03-14

## 2025-03-19 NOTE — TELEPHONE ENCOUNTER
RN received noticed from [facility] stating that Mirabegron is not the drug list, Medicare part D. Will not continue to cover this drug after patient received the 30 days supply unless patient gets an exception from his plan.     RN changed order to Myrbetriq, brand name  Mirabegron, generic cancelled.

## 2025-04-03 ENCOUNTER — ORDER TRANSCRIPTION (OUTPATIENT)
Dept: PHYSICAL THERAPY | Facility: HOSPITAL | Age: 75
End: 2025-04-03

## 2025-04-03 DIAGNOSIS — J38.3 VOCAL CORD DYSFUNCTION: Primary | ICD-10-CM

## 2025-04-07 ENCOUNTER — TELEPHONE (OUTPATIENT)
Dept: SPEECH THERAPY | Facility: HOSPITAL | Age: 75
End: 2025-04-07

## 2025-04-09 ENCOUNTER — OFFICE VISIT (OUTPATIENT)
Dept: SPEECH THERAPY | Facility: HOSPITAL | Age: 75
End: 2025-04-09
Attending: OTOLARYNGOLOGY
Payer: MEDICARE

## 2025-04-09 DIAGNOSIS — J38.3 VOCAL CORD DYSFUNCTION: Primary | ICD-10-CM

## 2025-04-09 PROCEDURE — 92524 BEHAVRAL QUALIT ANALYS VOICE: CPT

## 2025-04-09 NOTE — PROGRESS NOTES
ADULT SLP EVALUATION:     Diagnosis:   chronic cough Patient:  Rupali Alexis (74 year old, female)        Referring Provider: Roel Bucio  Today's Date: 4/9/2025    Precautions:   None Date of Evaluation: 04/09/25  Next MD visit: No data recorded     PATIENT SUMMARY   Summary of chief complaints: chronic neurogenic cough which started after having COVID in 2021. Patient states that cough occurs when she wakes up in the morning and when eating acidic foods. Patient denies respiratory conditions, exacerbation of symptoms when exerting self and change in temperatures.  History of current condition: since 2021 following COVID infection with persistent coughing since.   Pain level:  0 /10  Current limitations: difficulty decreasing coughing when changing posture (laying down) and maintaining vocal quality.  Pt goals: reducing chronic cough     Hospital History: N/A     Past medical history was reviewed with Rupali.  Significant findings include: h/o KELSEY Alexis  has a past medical history of Allergic rhinitis, Anxiety state, Esophageal reflux, Fibromyalgia, Pneumonia, Seasonal allergies, Sensory neuropathy, and Sleep apnea.  Medications Ordered Prior to this Encounter[1]    ASSESSMENT  Rupali presents to speech therapy evaluation with primary c/o chronic neurogenic cough. The results of the objective tests and measures show chronic neurogenic cough when changing posture, thinking about coughing, and eating/drinking. Functional deficits include but are not limited to difficulty decreasing coughing when changing posture (laying down) and maintaining vocal quality. Signs and symptoms are consistent with diagnosis of chronic cough. Pt and SLP discussed evaluation findings, pathology, POC and HEP.  Pt voiced understanding and performs HEP correctly. Skilled Speech Therapy is medically necessary to address the above impairments and reach functional goals.     OBJECTIVE:     VOICE ANALYSIS   Voice History  and Hygiene   Current Voice Diagnosis: chronic cough  Date of Onset: 2021  Date of ENT Evaluation: 4/3/2025  Laryngoscopy Findings: mild pachydermis, L NSD, normal BOT    Daily water intake: 17-32 oz  Smoking history: non-smoker    Current vocal demands: chronic cough  Vocal Abuses: social  Triggers: laying down and eating acidic foods        ORAL MOTOR EXAM   Facial and Oral Structure/Appearance:  intact   Dentition  adequate   Symmetry  symmetrical   Strength  WFL   Tone  WFL   Range of Motion  WFL   Rate of Motion  WFL   Resonance  oral focused   Respiration  adominal breathing   Articulation   WFL   Voice Quality  hoarse     Today's Treatment:  Rupali was educated on the anatomy and physiology of breathing and phonation.  She was then instructed in true vocal cord (TVC) movement during inhalation and exhalation and completed training in easy breathing strategies. Rupali was able to complete quiet, round-lip breathing in 10/10 trials, given direct modeling and instruction by the clinician.  She was also educated on strategies for preventing/remediating a VCD/chronic coughing attack, including exhalation followed by round lip-breathing or sniff-blow breathing. Rupali required direct verbal instruction to complete this sequence and was provided with a home exercise program verbally and in writing.      Today's Treatment and Response:   Pt education was provided on exam findings, treatment diagnosis, treatment plan, expectations, and prognosis.  Charges: EVAL x 1 [id number],  Total Treatment Time: 40 min                                                  PLAN OF CARE:    Goals: (to be met in 5 visits)   Patient will independently demonstrate easy breathing strategies and methods for preventing/remediating a VCD/chronic cough attack in 10/10 trials at rest.      Progress: New goal   2.   Patient will report improved breathing and decreased coughing across daily tasks for the duration of 1 month to improve ability to breathe  efficiently and fully participate in all daily activities.    Progress:   New goal              Frequency / Duration: Patient will be seen 1x/week or a total of 5  visits over a 90 day period. Treatment will include: speech therapy    Education or treatment limitation: None   Rehab Potential: good    Patient/Family/Caregiver was advised of these findings, precautions, and treatment options and has agreed to actively participate in planning and for this course of care.    Thank you for your referral. Please co-sign or sign and return this letter via fax as soon as possible to [phone]. If you have any questions, please contact me at Dept: [phone]    Sincerely,  Electronically signed by therapist: SUMANTH Huggins  Physician's certification required: Yes  I certify the need for these services furnished under this plan of treatment and while under my care.    X___________________________________________________ Date____________________    Certification From: 4/9/2025  To:7/8/2025         [1] Current Outpatient Medications on File Prior to Visit: MYRBETRIQ 50 MG Oral Tablet 24 Hr, Take 1 tablet (50 mg total) by mouth daily., famotidine 10 MG Oral Tab, 1 tablet as needed Orally Once a day, Turmeric (QC TUMERIC COMPLEX) 500 MG Oral Cap, , GEMTESA 75 MG Oral Tab, Take 1 tablet by mouth daily., gabapentin 100 MG Oral Cap, 1 capsule at night (Patient not taking: Reported on 11/12/2024), Cetirizine HCl (ZYRTEC) 10 MG Oral Chew Tab, 1 tab Oral, omega-3 fatty acids 1000 MG Oral Cap, 1 cap(s) orally Once a day, meclizine 25 MG Oral Tab, Take 1 tablet (25 mg total) by mouth 3 (three) times daily as needed., AZELASTINE HCL 0.1 % Nasal Solution, USE 2 SPRAYS IN EACH NOSTRIL TWICE DAILY, FLUTICASONE FUROATE IN, 1 spray(s) intranasally once a day, Magnesium 100 MG Oral Tab, Take 1.2 tablets (120 mg total) by mouth 2 (two) times daily as needed., ALPRAZolam 0.25 MG Oral Tab, Take 1 tablet (0.25 mg total) by mouth nightly as  needed., Clindamycin Phosphate 1 % External Gel, apply to face qd, Zolpidem Tartrate 5 MG Oral Tab, TK 1 T PO HS PRN FOR 30 DAYS, Multiple Vitamins-Minerals (MULTI VITAMIN/MINERALS OR), None Entered, GLUCOSAMINE CHONDR 1500 COMPLX OR, None Entered  Current Facility-Administered Medications on File Prior to Visit: ciprofloxacin (Cipro) tab 500 mg,

## 2025-04-16 ENCOUNTER — OFFICE VISIT (OUTPATIENT)
Dept: SPEECH THERAPY | Facility: HOSPITAL | Age: 75
End: 2025-04-16
Attending: OTOLARYNGOLOGY
Payer: MEDICARE

## 2025-04-16 PROCEDURE — 92507 TX SP LANG VOICE COMM INDIV: CPT

## 2025-04-16 NOTE — PROGRESS NOTES
Patient: Rupali Alexis (74 year old, female) Referring Provider:  Insurance:   Diagnosis: chronic cough Roel Bucio  MEDICARE   Precautions:  None Next MD visit:  [employer]    No data recorded Referral Information:    Date of Evaluation: Req: 0, Auth: 0, Exp:     04/09/25 POC Auth Visits:  5       Today's Date   4/16/2025        Treatment Day: 2    Subjective  Patient arrived early to session. Patient participated actively in therapeutic tasks and reported success with chronic cough breathing exercises.       Pain: 0/10     Objective  See goals below.           Goals (to be met in 5 visits)     Patient will independently demonstrate easy breathing strategies and methods for preventing/remediating a VCD/chronic cough attack in 10/10 trials at rest.   Patient demonstrates increased independence with use of breathing exercises targeting VCD/chronic cough.   Progress: Progressing   2.   Patient will report improved breathing and decreased coughing across daily tasks for the duration of 1 month to improve ability to breathe efficiently and fully participate in all daily activities.   Patient reports improvement in chronic cough symptoms within last week. Progress:   Progressing                 Assessment  Patient presents with chronic neurogenic cough when changing posture, thinking about coughing, and eating/drinking. Functional deficits include but are not limited to difficulty decreasing coughing when changing posture (laying down) and maintaining vocal quality. Patient reports success with use of chronic cough rescue breathing exercises (i.e., sniff-breathe and effortful swallow). Patient is receptive to training on use of resonant voicing exercises/strategies to reduce laryngeal tension and laryngeal focused resonance. Patient continues to benefit from skilled cueing and training. Continued intervention is medically necessary.    Plan  Continue speech therapy targeting voice and chronic  cough.    HEP  chronic cough exercises and RVT     Charges  [id number]    Total Treatment Time: 40 min

## 2025-04-23 ENCOUNTER — APPOINTMENT (OUTPATIENT)
Dept: SPEECH THERAPY | Facility: HOSPITAL | Age: 75
End: 2025-04-23
Attending: OTOLARYNGOLOGY
Payer: MEDICARE

## 2025-04-23 ENCOUNTER — TELEPHONE (OUTPATIENT)
Dept: SPEECH THERAPY | Facility: HOSPITAL | Age: 75
End: 2025-04-23

## 2025-04-28 ENCOUNTER — OFFICE VISIT (OUTPATIENT)
Dept: SPEECH THERAPY | Facility: HOSPITAL | Age: 75
End: 2025-04-28
Attending: OTOLARYNGOLOGY
Payer: MEDICARE

## 2025-04-28 PROCEDURE — 92507 TX SP LANG VOICE COMM INDIV: CPT

## 2025-04-28 NOTE — PROGRESS NOTES
Patient: Rupali Alexis (74 year old, female) Referring Provider:  Insurance:   Diagnosis: chronic cough Roel Bucio  MEDICARE   Precautions:  None Next MD visit:  [place] [employer]DEMN[employer]    No data recorded Referral Information:    Date of Evaluation: Req: 0, Auth: 0, Exp:     04/09/25 POC Auth Visits:  5       Today's Date   4/28/2025        Treatment Day: 3    Subjective  Patient arrived early to session. Patient participated actively in therapeutic tasks and continues to report success with use of breathing exercises to mitigate chronic cough.       Pain: 0/10     Objective  See goals below.           Goals (to be met in 5 visits)     Patient will independently demonstrate easy breathing strategies and methods for preventing/remediating a VCD/chronic cough attack in 10/10 trials at rest.   Patient demonstrates increased independence with use of breathing exercises targeting VCD/chronic cough. Patient utilizes breathing techniques during RVT exercises.   Progress: Progressing   2.   Patient will report improved breathing and decreased coughing across daily tasks for the duration of 1 month to improve ability to breathe efficiently and fully participate in all daily activities.   Patient reports improvement in chronic cough symptoms within last few weeks.  Progress:   Progressing               Assessment  Patient presents with chronic neurogenic cough when changing posture, thinking about coughing, and eating/drinking. Functional deficits include but are not limited to difficulty decreasing coughing when changing posture (laying down) and maintaining vocal quality. Patient reports success with use of chronic cough rescue breathing exercises (i.e., sniff-breathe and effortful swallow). Patient demonstrates progress toward goals and continues to benefit from training and cueing to support forward laryngeal resonance. Continued intervention is medically necessary.    Plan  Continue speech therapy targeting voice and  chronic cough.    HEP  chronic cough exercises and RVT     Charges  [id number]    Total Treatment Time: 40 min

## 2025-05-07 ENCOUNTER — OFFICE VISIT (OUTPATIENT)
Dept: SPEECH THERAPY | Facility: HOSPITAL | Age: 75
End: 2025-05-07
Attending: OTOLARYNGOLOGY
Payer: MEDICARE

## 2025-05-07 PROCEDURE — 92507 TX SP LANG VOICE COMM INDIV: CPT

## 2025-05-07 NOTE — PROGRESS NOTES
Patient: Rupali Alexis (74 year old, female) Referring Provider:  Insurance:   Diagnosis: chronic cough Roel Bucio  MEDICARE   Precautions:  None Next MD visit:  [employer] INDEMNITY    No data recorded Referral Information:    Date of Evaluation: Req: 0, Auth: 0, Exp:     04/09/25 POC Auth Visits:  5     Discharge Summary  Pt has attended 4 visits in Speech Therapy.      Today's Date   5/7/2025        Treatment Day: 4    Dear Dr. Bucio,  This letter is to inform you of Rupali Alexis's progress in speech-language therapy.    Since her initial evaluation, Rupali has attended 4 sessions. Therapy sessions have targeted chronic cough and hoarseness. A home exercise program (HEP) addressing use of breathing strategies and RVT exercises has been provided and completed consistently. During this treatment period, Rupali has demonstrated improved vocal quality and decreased coughing. As she has demonstrated significant progress and is independent with completion of exercises, it is recommended that she be discharged from speech therapy at this time.    Subjective  Patient arrived early to session. Patient participated actively in therapeutic tasks and continues to report success with use of breathing exercises to mitigate chronic cough.       Pain: 0/10     Objective  See goals below.     Goals (to be met in 5 visits)     Patient will independently demonstrate easy breathing strategies and methods for preventing/remediating a VCD/chronic cough attack in 10/10 trials at rest.   Independent with use of breathing strategies and completion of RVT exercises.   Progress: Goal met   2.   Patient will report improved breathing and decreased coughing across daily tasks for the duration of 1 month to improve ability to breathe efficiently and fully participate in all daily activities.   Improvement in cough symptoms over past month. Progress:   Goal met.                Assessment  Patient presented to speech therapy with  chronic neurogenic cough when changing posture, thinking about coughing, and eating/drinking. Throughout plan of care, patient has been receptive to training on chronic cough rescue breathing techniques and RVT to reduce hoarseness. Patient demonstrates independence with completion of exercises and improving vocal quality. As patient no longer requires skilled cueing and self-monitors independently, it is recommended that she be discharged from speech therapy at this time with continued compliance to HEP.    Plan: Patient to be discharged from speech-language therapy as all goals have been met.   HEP  chronic cough exercises and RVT     Charges  [id number]    Total Treatment Time: 45 min    Patient/Family/Caregiver was advised of these findings, precautions, and treatment options and has agreed to actively participate in planning and for this course of care.    Thank you for your referral. If you have any questions, please contact me at Dept: [phone].    Sincerely,  Electronically signed by therapist: SUMANTH Huggins

## 2025-05-13 ENCOUNTER — APPOINTMENT (OUTPATIENT)
Dept: SPEECH THERAPY | Facility: HOSPITAL | Age: 75
End: 2025-05-13
Attending: OTOLARYNGOLOGY
Payer: MEDICARE

## 2025-07-16 ENCOUNTER — TELEPHONE (OUTPATIENT)
Dept: SURGERY | Facility: CLINIC | Age: 75
End: 2025-07-16

## 2025-07-16 NOTE — TELEPHONE ENCOUNTER
Received warm transfer - call center.  Patient verbalizes has been taking   Tolterodone for the last 8 months prescribed by PCP.  Patient states had stopped myrbetriq   And started tolterodone and side effects include heart beating and racing  States has a watch that can show heart beat and reading was 162.   Denies shortness of breath, denies feeling heart beating fast or racing, denies dizziness, denies pain, Asymptomatic.  Patient concerned medication tolterodine (prescribed by PCP) is causing heart racing showing on watch 162.  Attempted to walk through patient on how to take own pulse - patient unable.  Instructed patient to take watch off and put on other wrist or put back on same wrist  - heart beat reading 78 12 minutes ago and then while speaking to patient states reading 170's then down to 164's.   Patient verbalizes she contacted her PCP earlier and was given appointment tomorrow.  Instructed patient to go to immediate care or ER Now to be evaluated - If symptoms changes or worse - feeling heart racing, pounding, pain, dizziness, shortness of breath call 911 - patient Asymptomatic - denies feeling heart racing, pounding, pain, dizziness, shortness of breath.  Patient verbalizes understanding.  Instructed patient above and for patient to also contact PCP. Patient verbalizes understanding.

## 2025-07-16 NOTE — TELEPHONE ENCOUNTER
Pt called.  Was rx.  Three medication.  Rx. Tolterodine tartrate.  Side effect is irregular heartbeat.  Pt is having rapid heartbeat now.   162.  Call transferred to the nurse.

## (undated) NOTE — ED AVS SNAPSHOT
Chance Gaines   MRN: [id number]    Department:  [facility] Emergency Department   Date of Visit:  3/23/2018           Disclosure     Insurance plans vary and the physician(s) referred by the ER may not be covered by your plan.  Please contact yo tell this physician (or your personal doctor if your instructions are to return to your personal doctor) about any new or lasting problems. The primary care or specialist physician will see patients referred from the [facility] Emergency Department.  Severo Pastures

## (undated) NOTE — ED AVS SNAPSHOT
Emilianadiana Segura   MRN: [id number]    Department:  [facility] Emergency Department   Date of Visit:  1/8/2020           Disclosure     Insurance plans vary and the physician(s) referred by the ER may not be covered by your plan.  Please contact you tell this physician (or your personal doctor if your instructions are to return to your personal doctor) about any new or lasting problems. The primary care or specialist physician will see patients referred from the [facility] Emergency Department.  Dylan Santiago

## (undated) NOTE — LETTER
Patient Name: Rupali Alexis  YOB: 1950          MRN number:  [id number]  Date:  5/7/2025  Referring Physician:  Roel Bucio    Discharge Summary  Pt has attended 4 visits in Speech Therapy.      Today's Date   5/7/2025        Treatment Day: 4    Dear Dr. Bucio,  This letter is to inform you of Rupali Alexis's progress in speech-language therapy.    Since her initial evaluation, Rupali has attended 4 sessions. Therapy sessions have targeted chronic cough and hoarseness. A home exercise program (HEP) addressing use of breathing strategies and RVT exercises has been provided and completed consistently. During this treatment period, Rupali has demonstrated improved vocal quality and decreased coughing. As she has demonstrated significant progress and is independent with completion of exercises, it is recommended that she be discharged from speech therapy at this time.    Subjective  Patient arrived early to session. Patient participated actively in therapeutic tasks and continues to report success with use of breathing exercises to mitigate chronic cough.       Pain: 0/10     Objective  See goals below.   Goals (to be met in 5 visits)           Patient will independently demonstrate easy breathing strategies and methods for preventing/remediating a VCD/chronic cough attack in 10/10 trials at rest.   Independent with use of breathing strategies and completion of RVT exercises.    Progress: Goal met   2.   Patient will report improved breathing and decreased coughing across daily tasks for the duration of 1 month to improve ability to breathe efficiently and fully participate in all daily activities.   Improvement in cough symptoms over past month. Progress:    Goal met.                 Assessment  Patient presented to speech therapy with chronic neurogenic cough when changing posture, thinking about coughing, and eating/drinking. Throughout plan of care, patient has been receptive to training on  chronic cough rescue breathing techniques and RVT to reduce hoarseness. Patient demonstrates independence with completion of exercises and improving vocal quality. As patient no longer requires skilled cueing and self-monitors independently, it is recommended that she be discharged from speech therapy at this time with continued compliance to HEP.     Plan: Patient to be discharged from speech-language therapy as all goals have been met.   HEP  chronic cough exercises and RVT                Charges  [id number]     Total Treatment Time: 45 min     Patient/Family/Caregiver was advised of these findings, precautions, and treatment options and has agreed to actively participate in planning and for this course of care.     Thank you for your referral. If you have any questions, please contact me at Dept: [phone].     Sincerely,  Electronically signed by therapist: SUMANTH Huggins     21st Century Cures Act Notice to Patient: Medical documents like this are made available to patients in the interest of transparency. However, be advised this is a medical document and it is intended as tobu-as-vrjy communication between your medical providers. This medical document may contain abbreviations, assessments, medical data, and results or other terms that are unfamiliar. Medical documents are intended to carry relevant information, facts as evident, and the clinical opinion of the practitioner. As such, this medical document may be written in language that appears blunt or direct. You are encouraged to contact your medical provider and/or [facility] if you have any questions about this medical document.